# Patient Record
Sex: FEMALE | Race: WHITE | NOT HISPANIC OR LATINO | Employment: OTHER | URBAN - METROPOLITAN AREA
[De-identification: names, ages, dates, MRNs, and addresses within clinical notes are randomized per-mention and may not be internally consistent; named-entity substitution may affect disease eponyms.]

---

## 2017-01-26 ENCOUNTER — OFFICE VISIT (OUTPATIENT)
Dept: GYNECOLOGIC ONCOLOGY | Facility: CLINIC | Age: 72
End: 2017-01-26

## 2017-01-26 VITALS
DIASTOLIC BLOOD PRESSURE: 69 MMHG | BODY MASS INDEX: 24.84 KG/M2 | TEMPERATURE: 98.2 F | HEART RATE: 71 BPM | WEIGHT: 135 LBS | OXYGEN SATURATION: 98 % | RESPIRATION RATE: 14 BRPM | SYSTOLIC BLOOD PRESSURE: 159 MMHG | HEIGHT: 62 IN

## 2017-01-26 DIAGNOSIS — N85.8 UTERINE MASS: Primary | ICD-10-CM

## 2017-01-26 DIAGNOSIS — N95.0 PMB (POSTMENOPAUSAL BLEEDING): ICD-10-CM

## 2017-01-26 PROCEDURE — 99204 OFFICE O/P NEW MOD 45 MIN: CPT | Performed by: OBSTETRICS & GYNECOLOGY

## 2017-01-26 RX ORDER — CYANOCOBALAMIN (VITAMIN B-12) 5000 MCG
5000 TABLET,DISINTEGRATING ORAL DAILY
COMMUNITY

## 2017-01-26 RX ORDER — ATORVASTATIN CALCIUM 10 MG/1
10 TABLET, FILM COATED ORAL DAILY
COMMUNITY
Start: 2017-01-18 | End: 2020-01-17 | Stop reason: DRUGHIGH

## 2017-01-26 RX ORDER — HYDROXYCHLOROQUINE SULFATE 200 MG/1
200 TABLET, FILM COATED ORAL DAILY
Refills: 3 | COMMUNITY
Start: 2016-11-06 | End: 2022-09-15

## 2017-01-26 RX ORDER — CHOLECALCIFEROL (VITAMIN D3) 125 MCG
1 CAPSULE ORAL DAILY
COMMUNITY

## 2017-01-26 RX ORDER — NICOTINE POLACRILEX 2 MG
1 GUM BUCCAL DAILY
COMMUNITY
End: 2017-02-28

## 2017-01-26 RX ORDER — AMLODIPINE BESYLATE AND BENAZEPRIL HYDROCHLORIDE 10; 20 MG/1; MG/1
CAPSULE ORAL
Refills: 11 | COMMUNITY
Start: 2017-01-18

## 2017-01-26 RX ORDER — HYDROCHLOROTHIAZIDE 25 MG/1
TABLET ORAL
Refills: 3 | COMMUNITY
Start: 2016-11-06 | End: 2022-08-09 | Stop reason: HOSPADM

## 2017-01-26 RX ORDER — ASPIRIN 81 MG/1
81 TABLET ORAL DAILY
COMMUNITY
End: 2022-09-08

## 2017-01-26 RX ORDER — FLUTICASONE PROPIONATE 50 MCG
2 SPRAY, SUSPENSION (ML) NASAL DAILY PRN
COMMUNITY

## 2017-01-26 NOTE — LETTER
Mary Saucedo  [id number]  1945      Reason for visit:  Postmenopausal bleeding     History of present illness:  The patient is a 71 y.o. year old female who presents today for postmenopausal bleeding.      She notes cramps since 2016.  She has had dark spotting since 10/2016.  The bleeding is 3 days at a time every 28 days.  She was referred to Dr. Harrell and underwent TVUS 2017 and a mass was seen.  No endometrial stripe was noted, and no ovaries were seen.  Cervix was normal.  No biopsy was performed due to mass.  Patient is unsure if she had fibroids when she was younger nor not.    She notes some white discharge, but no distinct tissue.  She underwent pap and pelvic by Dr. Quiros 2015.  She notes some fecal urgency, and some RT flank pain last night.  She noted pelvic pressure last night as well.        OBGYN History:  She is a .  She does not use HRT. She does not have a history of abnormal pap smears.      Oncologic History:   No history exists.         Past Medical History   Diagnosis Date   • Arthritis    • Bilateral carotid artery occlusion    • Cataracts, bilateral    • Chronic sinusitis    • Heart murmur    • Hyperlipidemia    • Hypertensive disorder    • PMB (postmenopausal bleeding)    • Sjoegren syndrome    • Uterine fibroid    • Wrist fracture, left        Past Surgical History   Procedure Laterality Date   • Hemorrhoidectomy     • Breast lumpectomy Right    • Breast lumpectomy Left 2009   • Belpharoptosis repair Bilateral        MEDICATIONS: The current medication list was reviewed with the patient and updated in the EMR this date per the Medical Assistant. Medication dosages and frequencies were confirmed to be accurate.      Allergies:  is allergic to codeine.    Social History:   Social History     Social History   • Marital status:      Spouse name: N/A   • Number of children: 1   • Years of education: N/A     Occupational History   • Not on file.        Social History Main Topics   • Smoking status: Former Smoker   • Smokeless tobacco: Not on file   • Alcohol use Yes   • Drug use: No   • Sexual activity: No     Other Topics Concern   • Not on file     Social History Narrative   • No narrative on file       Family History:    Family History   Problem Relation Age of Onset   • Alzheimer's disease Mother 80   • Colon cancer Father 60   • Heart attack Father 40   • Emphysema Father    • Arthritis Sister    • Stomach cancer Paternal Grandfather    • Lactose intolerance Daughter      milk allergy       Health Maintenance:    Health Maintenance   Topic Date Due   • HEPATITIS C SCREENING  1945   • TDAP/TD VACCINES (1 - Tdap) 03/08/1964   • ZOSTER VACCINE  03/08/2005   • PNEUMOCOCCAL VACCINES (65+ LOW/MEDIUM RISK) (1 of 2 - PCV13) 03/08/2010   • INFLUENZA VACCINE  08/01/2016   • MAMMOGRAM  12/20/2017         Review of Systems   Constitutional: Positive for unexpected weight change (10 pounds over 4 months weight gain). Negative for appetite change, fatigue and fever.   HENT: Positive for sinus pressure and tinnitus. Negative for congestion, hearing loss and sore throat.    Eyes: Positive for itching. Negative for pain and visual disturbance (Corrective lenses).   Respiratory: Negative for cough, shortness of breath and wheezing.    Cardiovascular: Negative for chest pain and leg swelling.   Gastrointestinal: Positive for abdominal pain (Hiatal hernia). Negative for abdominal distention, constipation, diarrhea, nausea and vomiting.   Endocrine: Negative for cold intolerance and heat intolerance.   Genitourinary: Positive for urgency, vaginal bleeding and vaginal discharge. Negative for difficulty urinating, dyspareunia, dysuria and frequency.   Musculoskeletal: Negative for back pain, gait problem and joint swelling.   Skin: Negative for rash.   Allergic/Immunologic: Negative for immunocompromised state.   Neurological: Negative for seizures, syncope, weakness,  numbness and headaches.   Hematological: Positive for adenopathy. Does not bruise/bleed easily.   Psychiatric/Behavioral: Negative for confusion, dysphoric mood and sleep disturbance. The patient is not nervous/anxious.        Physical Exam    Vitals:    01/26/17 1406   BP: 159/69   Pulse: 71   Resp: 14   Temp: 98.2 °F (36.8 °C)   SpO2: 98%     Body mass index is 24.49 kg/(m^2).      GENERAL: Alert, well-appearing female appearing her stated age who is in no apparent distress.   HEENT: Sclera anicteric. Head normocephalic, atraumatic. Mucus membranes moist.   NECK: Trachea midline, supple, without masses.  No thyromegaly.   BREASTS: Deferred  CARDIOVASCULAR: Normal rate, regular rhythm, no murmurs, rubs, or gallops.  No peripheral edema.  RESPIRATORY: Clear to auscultation bilaterally, normal respiratory effort  GASTROINTESTINAL:  Abdomen is soft, non-tender, non-distended, no rebound or guarding, no masses, or hernias. No HSM.    SKIN:  Warm, dry, well-perfused.  All visible areas intact.  No rashes, lesions, ulcers.  PSYCHIATRIC: AO x3, with appropriate affect, normal thought processes.  NEUROLOGIC: No focal deficits.    MUSCULOSKELETAL: Normal gait and station.   EXTREMITIES:   No cyanosis, clubbing, symmetric.  LYMPHATICS:  No cervical or inguinal adenopathy noted.     PELVIC exam:    GYNECOLOGIC:  External genitalia are free from lesion. Vaginal introitus is small.  On speculum examination, the cervix was free from lesion. On bimanual examination no mass was appreciated.  Uterus was enlarged for age in size and shape. There is no cervical motion or uterine tenderness. No cervical mass was palpated. Parametria were smooth. Rectovaginal exam was deferred.     ECOG PS 0    PROCEDURES:  none    Diagnostic Data:    Radiologic tests were reviewed  I reviewed records which are summarized as noted in HPI.       No results found for:        Assessment/Plan   This is a 71 y.o. woman with PMB.  She was offered D&C  with hysteroscopy vs definitive management with hysterectomy.  I think it is in her best interest to consider a step wise progression in diagnosis and treatment.  She was consented for surgery.  Risks and benefits were discussed.  All questions were answered.      FOLLOW UP: Return for surgery.    This was a 45 minute long visit with greater that 50% spent in face-to-face consultation regarding the differential diagnosis of PMB and diagnostic evaluation thereof.    Note: Speech recognition transcription software was used to dictate portions of this document.  An attempt at proofreading has been made though minor errors in transcription may still be present.  Please do not hesitate to call our office with any questions.      Electronically Signed by: Addis Mccord MD  Date: 1/28/2017

## 2017-01-26 NOTE — MR AVS SNAPSHOT
Mary Sultanaeld   2017 3:15 PM   Office Visit    Dept Phone:  [phone]   Encounter #:  [id number]    Provider:  Addis Mccord MD   Department:  [facility] GYNECOLOGIC ONCOLOGY                Your Full Care Plan              Your Updated Medication List          This list is accurate as of: 17  3:48 PM.  Always use your most recent med list.                amLODIPine-benazepril 10-20 MG per capsule   Commonly known as:  LOTREL       aspirin 81 MG EC tablet       atorvastatin 10 MG tablet   Commonly known as:  LIPITOR       Biotin 1 MG capsule       fluticasone 50 MCG/ACT nasal spray   Commonly known as:  FLONASE       hydrochlorothiazide 25 MG tablet   Commonly known as:  HYDRODIURIL       hydroxychloroquine 200 MG tablet   Commonly known as:  PLAQUENIL       vitamin B-12 1000 MCG tablet   Commonly known as:  CYANOCOBALAMIN       Vitamin D3 2000 UNITS tablet               Instructions     None    Patient Instructions History      Upcoming Appointments     Visit Type Date Time Department    NEW PATIENT 2017  3:15 PM [facility]      [facility] [employer] Signup     [facility] [employer] allows you to send messages to your doctor, view your test results, renew your prescriptions, schedule appointments, and more. To sign up, go to [employer] and click on the Sign Up Now link in the New User? box. Enter your Eligible Activation Code exactly as it appears below along with the last four digits of your Social Security Number and your Date of Birth () to complete the sign-up process. If you do not sign up before the expiration date, you must request a new code.    Eligible Activation Code: [email or web address]  Expires: 2017  3:48 PM    If you have questions, you can email [email or web address] or call [phone] to talk to our Eligible staff. Remember, Eligible is NOT to be used for urgent needs. For medical emergencies, dial 911.             "  Other Info from Your Visit           Allergies     Codeine  Rash      Reason for Visit     Establish Care ovarian mass      Vital Signs     Blood Pressure Pulse Temperature Respirations Height Weight    159/69 71 98.2 °F (36.8 °C) (Temporal Artery ) 14 62.25\" (158.1 cm) 135 lb (61.2 kg)    Oxygen Saturation Body Mass Index Smoking Status             98% 24.49 kg/m2 Former Smoker           "

## 2017-01-26 NOTE — PROGRESS NOTES
Mary Saucedo  [id number]  1945      Reason for visit:  Postmenopausal bleeding     History of present illness:  The patient is a 71 y.o. year old female who presents today for postmenopausal bleeding.      She notes cramps since 2016.  She has had dark spotting since 10/2016.  The bleeding is 3 days at a time every 28 days.  She was referred to Dr. Harrell and underwent TVUS 2017 and a mass was seen.  No endometrial stripe was noted, and no ovaries were seen.  Cervix was normal.  No biopsy was performed due to mass.  Patient is unsure if she had fibroids when she was younger nor not.    She notes some white discharge, but no distinct tissue.  She underwent pap and pelvic by Dr. Quiros 2015.  She notes some fecal urgency, and some RT flank pain last night.  She noted pelvic pressure last night as well.        OBGYN History:  She is a .  She does not use HRT. She does not have a history of abnormal pap smears.      Oncologic History:   No history exists.         Past Medical History   Diagnosis Date   • Arthritis    • Bilateral carotid artery occlusion    • Cataracts, bilateral    • Chronic sinusitis    • Heart murmur    • Hyperlipidemia    • Hypertensive disorder    • PMB (postmenopausal bleeding)    • Sjoegren syndrome    • Uterine fibroid    • Wrist fracture, left        Past Surgical History   Procedure Laterality Date   • Hemorrhoidectomy     • Breast lumpectomy Right    • Breast lumpectomy Left 2009   • Belpharoptosis repair Bilateral        MEDICATIONS: The current medication list was reviewed with the patient and updated in the EMR this date per the Medical Assistant. Medication dosages and frequencies were confirmed to be accurate.      Allergies:  is allergic to codeine.    Social History:   Social History     Social History   • Marital status:      Spouse name: N/A   • Number of children: 1   • Years of education: N/A     Occupational History   • Not on file.      Social History Main Topics   • Smoking status: Former Smoker   • Smokeless tobacco: Not on file   • Alcohol use Yes   • Drug use: No   • Sexual activity: No     Other Topics Concern   • Not on file     Social History Narrative   • No narrative on file       Family History:    Family History   Problem Relation Age of Onset   • Alzheimer's disease Mother 80   • Colon cancer Father 60   • Heart attack Father 40   • Emphysema Father    • Arthritis Sister    • Stomach cancer Paternal Grandfather    • Lactose intolerance Daughter      milk allergy       Health Maintenance:    Health Maintenance   Topic Date Due   • HEPATITIS C SCREENING  1945   • TDAP/TD VACCINES (1 - Tdap) 03/08/1964   • ZOSTER VACCINE  03/08/2005   • PNEUMOCOCCAL VACCINES (65+ LOW/MEDIUM RISK) (1 of 2 - PCV13) 03/08/2010   • INFLUENZA VACCINE  08/01/2016   • MAMMOGRAM  12/20/2017         Review of Systems   Constitutional: Positive for unexpected weight change (10 pounds over 4 months weight gain). Negative for appetite change, fatigue and fever.   HENT: Positive for sinus pressure and tinnitus. Negative for congestion, hearing loss and sore throat.    Eyes: Positive for itching. Negative for pain and visual disturbance (Corrective lenses).   Respiratory: Negative for cough, shortness of breath and wheezing.    Cardiovascular: Negative for chest pain and leg swelling.   Gastrointestinal: Positive for abdominal pain (Hiatal hernia). Negative for abdominal distention, constipation, diarrhea, nausea and vomiting.   Endocrine: Negative for cold intolerance and heat intolerance.   Genitourinary: Positive for urgency, vaginal bleeding and vaginal discharge. Negative for difficulty urinating, dyspareunia, dysuria and frequency.   Musculoskeletal: Negative for back pain, gait problem and joint swelling.   Skin: Negative for rash.   Allergic/Immunologic: Negative for immunocompromised state.   Neurological: Negative for seizures, syncope, weakness,  numbness and headaches.   Hematological: Positive for adenopathy. Does not bruise/bleed easily.   Psychiatric/Behavioral: Negative for confusion, dysphoric mood and sleep disturbance. The patient is not nervous/anxious.        Physical Exam    Vitals:    01/26/17 1406   BP: 159/69   Pulse: 71   Resp: 14   Temp: 98.2 °F (36.8 °C)   SpO2: 98%     Body mass index is 24.49 kg/(m^2).      GENERAL: Alert, well-appearing female appearing her stated age who is in no apparent distress.   HEENT: Sclera anicteric. Head normocephalic, atraumatic. Mucus membranes moist.   NECK: Trachea midline, supple, without masses.  No thyromegaly.   BREASTS: Deferred  CARDIOVASCULAR: Normal rate, regular rhythm, no murmurs, rubs, or gallops.  No peripheral edema.  RESPIRATORY: Clear to auscultation bilaterally, normal respiratory effort  GASTROINTESTINAL:  Abdomen is soft, non-tender, non-distended, no rebound or guarding, no masses, or hernias. No HSM.    SKIN:  Warm, dry, well-perfused.  All visible areas intact.  No rashes, lesions, ulcers.  PSYCHIATRIC: AO x3, with appropriate affect, normal thought processes.  NEUROLOGIC: No focal deficits.    MUSCULOSKELETAL: Normal gait and station.   EXTREMITIES:   No cyanosis, clubbing, symmetric.  LYMPHATICS:  No cervical or inguinal adenopathy noted.     PELVIC exam:    GYNECOLOGIC:  External genitalia are free from lesion. Vaginal introitus is small.  On speculum examination, the cervix was free from lesion. On bimanual examination no mass was appreciated.  Uterus was enlarged for age in size and shape. There is no cervical motion or uterine tenderness. No cervical mass was palpated. Parametria were smooth. Rectovaginal exam was deferred.     ECOG PS 0    PROCEDURES:  none    Diagnostic Data:    Radiologic tests were reviewed  I reviewed records which are summarized as noted in HPI.       No results found for:        Assessment/Plan   This is a 71 y.o. woman with PMB.  She was offered D&C  with hysteroscopy vs definitive management with hysterectomy.  I think it is in her best interest to consider a step wise progression in diagnosis and treatment.  She was consented for surgery.  Risks and benefits were discussed.  All questions were answered.      FOLLOW UP: Return for surgery.    This was a 45 minute long visit with greater that 50% spent in face-to-face consultation regarding the differential diagnosis of PMB and diagnostic evaluation thereof.    Note: Speech recognition transcription software was used to dictate portions of this document.  An attempt at proofreading has been made though minor errors in transcription may still be present.  Please do not hesitate to call our office with any questions.      Electronically Signed by: Addis Mccord MD  Date: 1/28/2017

## 2017-01-28 PROBLEM — N95.0 PMB (POSTMENOPAUSAL BLEEDING): Status: ACTIVE | Noted: 2017-01-28

## 2017-01-28 PROBLEM — N85.8 UTERINE MASS: Status: ACTIVE | Noted: 2017-01-28

## 2017-01-30 ENCOUNTER — PREP FOR SURGERY (OUTPATIENT)
Dept: GYNECOLOGIC ONCOLOGY | Facility: CLINIC | Age: 72
End: 2017-01-30

## 2017-01-30 DIAGNOSIS — N95.0 PMB (POSTMENOPAUSAL BLEEDING): Primary | ICD-10-CM

## 2017-01-31 ENCOUNTER — PREP FOR SURGERY (OUTPATIENT)
Dept: GYNECOLOGIC ONCOLOGY | Facility: CLINIC | Age: 72
End: 2017-01-31

## 2017-01-31 NOTE — PATIENT INSTRUCTIONS
Outpatient Pre-op Patient Education  *See checked boxes for your instructions*    Mary Saucedo  [id number]  1945    SURGEON: Dr. Mccord    Appointment  [x]  1. Your surgery has been scheduled on 2-13-17 at [facility] located at [address]. You will need to be there at 9:00 AM.   [x] 2.  You have pre-admission testing (PAT) appointment on 2-12-17 at 2:00 PM.  You will need to be at hospital registration 10 minutes before that time.     [x] 3.  The registration department is located in the long hallway between the [address].       The Day(s) Before Surgery  [x] 1.  Do not drink alcohol or smoke.     [x] 2.  Do not take vitamins or aspirin one week before surgery.       [x] 3.  If you are ill on the days leading up to your surgery, please call our office.      [x] 4.  If you are using medications for diabetes, call the physician who manages these and get instructions on how they should be taken before and after surgery.    [x] 5.  Nothing to eat or drink after midnight on 2-12-17.      [x] 6.  Please make prior arrangements for someone to drive you home after your procedure        The Day of Surgery  [x] 1.  Do not eat, drink, or chew gum.     [x] 2.  On the morning of your surgery, you may take her prescription medications with a sip of water. Bring all medication with you to the surgery center. (Diabetic patients should bring insulin if instructed to do so by their diabetes managing physician).   [x] 3. Bathe or shower the morning of your surgery. Do not use powders, lotions, or creams. Deodorants are okay.     [x] 4. Wear loose, comfortable clothing.     [x] 5. Bring holders for glasses, contacts, or dentures.      [x] 6. Bring any required payment and forms, including insurance cards. Leave all money and valuables at home.        Post-surgery Instructions  [x] 1.  For the first 24 hours, rest and take periodic deep breaths to remove anesthetic agents from  your body.   [x] 2.  Follow any specific instructions relevant to your particular surgery.     [x] 3.  Limit activity to avoid stress to the surgical site.      [x] 4.  Keep all dressings dry. Shower or bathe as instructed by your doctor.     [x] 5.  Drink and eat light foods. Remain on liquids alone only if nausea and vomiting occur. Return to your regular diet gradually, as tolerance allows.    [x] 6. Avoid alcohol for at least 24 hours.      [x] 7.  Take prescription pain medication as directed and with food.      [x] 8.  Call our office after discharge from the surgery center to make your post-op appointment.        In Case of Emergency:  Call our office if you experience any of the following:  - Excessive drainage, bleeding, swelling, or redness at the incision site  - Severe pain not eased by pain medication  - Temperature above 101  - Persistent nausea or vomiting  - Skin rash or general body itching

## 2017-02-07 ENCOUNTER — TELEPHONE (OUTPATIENT)
Dept: GYNECOLOGIC ONCOLOGY | Facility: CLINIC | Age: 72
End: 2017-02-07

## 2017-02-07 NOTE — TELEPHONE ENCOUNTER
Pt returned call to office, informed her of [facility] being out of network with her insurance, number given to [facility] to talk about pricing.  Pt states she thinks she has a cold, she is congested, has wet cough, chills.  Instructed her to call her PCP to be seen and evaluated, call me back Thursday or Friday with update, or sooner if needed.  Pt verbalized understanding, will call if needed.

## 2017-02-09 ENCOUNTER — PREP FOR SURGERY (OUTPATIENT)
Dept: GYNECOLOGIC ONCOLOGY | Facility: CLINIC | Age: 72
End: 2017-02-09

## 2017-02-09 ENCOUNTER — TELEPHONE (OUTPATIENT)
Dept: GYNECOLOGIC ONCOLOGY | Facility: CLINIC | Age: 72
End: 2017-02-09

## 2017-02-09 DIAGNOSIS — N95.0 PMB (POSTMENOPAUSAL BLEEDING): Primary | ICD-10-CM

## 2017-02-09 RX ORDER — SODIUM CHLORIDE 0.9 % (FLUSH) 0.9 %
1-10 SYRINGE (ML) INJECTION AS NEEDED
Status: CANCELLED | OUTPATIENT
Start: 2017-02-09

## 2017-02-09 NOTE — TELEPHONE ENCOUNTER
Phoned pt regarding surgery scheduling.  States was started on Amoxicillin for URI and just called to see about getting in today to see PCP due to fever of 101 this am.  Told pt we would cancel her outpt procedure for Monday, instructed her she needed to be cleared after abx therapy by her PCP before having her surgery with Dr. Mccord, then we would schedule it in the [facility] OR but as an outpt, go home the same day.  Pt verbalized understanding, she will call me to let me know when she is scheduled for her f/u with PCP.

## 2017-02-17 ENCOUNTER — TELEPHONE (OUTPATIENT)
Dept: GYNECOLOGIC ONCOLOGY | Facility: CLINIC | Age: 72
End: 2017-02-17

## 2017-02-17 ENCOUNTER — PREP FOR SURGERY (OUTPATIENT)
Dept: GYNECOLOGIC ONCOLOGY | Facility: CLINIC | Age: 72
End: 2017-02-17

## 2017-02-17 DIAGNOSIS — N95.0 PMB (POSTMENOPAUSAL BLEEDING): Primary | ICD-10-CM

## 2017-02-17 NOTE — TELEPHONE ENCOUNTER
Pt phoned office.  States saw PCP this am, had CXR, she is fine for surgery.  Date set for 3-1-17 with Dr. Mccord.

## 2017-02-21 ENCOUNTER — PREP FOR SURGERY (OUTPATIENT)
Dept: GYNECOLOGIC ONCOLOGY | Facility: CLINIC | Age: 72
End: 2017-02-21

## 2017-02-21 RX ORDER — SODIUM CHLORIDE 0.9 % (FLUSH) 0.9 %
1-10 SYRINGE (ML) INJECTION AS NEEDED
Status: CANCELLED | OUTPATIENT
Start: 2017-02-21

## 2017-02-21 NOTE — PATIENT INSTRUCTIONS
Gynecologic Oncology  Inpatient Pre-op Patient Education  *See checked boxes for your instructions*    Patient Name:  Mary Saucedo  [id number]  1945    Surgeon:  Dr. Mccord    Appointment  [x]  1. Your surgery has been scheduled on 3-1-17. You will need to be at the second floor surgery registration of the [facility] hospital on that day at 8:00 AM.   [x] 2.  You have a pre-admission testing (PAT) appointment for labs and possibly chest xray and EKG, on 2-28-17 at 10:30 AM.  You will need to be at hospital registration on the first floor, 10 minutes before that time.     [x] 3.  The hospital registration department is located in the long hallway between the [address].     The Day(s) Before Surgery  [x] 1. On 2-28-17, the day prior to surgery, eat lightly.  No solid food after midnight on 2-28-17, including NO MILK, CREAM, OR ORANGE JUICE.  You may have sips of clear fluids up until two-three hours prior to your arrival to the hospital on the morning of surgery.     [] 2.  You may need to use a stool softener, the day prior to surgery to help with existing constipation and to clean out your bowels.  You can purchase Miralax over-the-counter at the pharmacy and follow the directions on the back.  (Do not do this step unless the box is checked).   [x] 3.  Do not take vitamins or full dose aspirin one week before surgery.  If you normally take a blood thinning medication such as Warfarin, Eliquis, or Xarelto, we will give you specific instructions regarding these medications and we may need to talk with your other doctors.   [x] 4.  On the morning of your surgery, you may likely take your routine prescription medications with a sip of water as reviewed with you by your surgeon.  Bring your home medications with you to the hospital as we may need to reference these.  In particular be sure to bring any inhalers.     Post-surgery Instructions  [] 1.  The length of stay for your type of surgery is  typically one hospital night, however it is also possible that you could be discharged home in the evening on the same day depending on the nature of your surgery.  All rooms are private, so family member may stay with you.     [x] 2.  Do not take your own home prescription medication while you are in the hospital unless otherwise instructed.  These will be provided to you.         Comments:

## 2017-02-28 ENCOUNTER — HOSPITAL ENCOUNTER (OUTPATIENT)
Dept: GENERAL RADIOLOGY | Facility: HOSPITAL | Age: 72
Discharge: HOME | End: 2017-02-28
Admitting: OBSTETRICS & GYNECOLOGY

## 2017-02-28 ENCOUNTER — APPOINTMENT (OUTPATIENT)
Dept: PREADMISSION TESTING | Facility: HOSPITAL | Age: 72
End: 2017-02-28

## 2017-02-28 ENCOUNTER — ANESTHESIA EVENT (OUTPATIENT)
Dept: PERIOP | Facility: HOSPITAL | Age: 72
End: 2017-02-28

## 2017-02-28 VITALS — WEIGHT: 135.58 LBS | HEIGHT: 62 IN | BODY MASS INDEX: 24.95 KG/M2

## 2017-02-28 DIAGNOSIS — N95.0 PMB (POSTMENOPAUSAL BLEEDING): ICD-10-CM

## 2017-02-28 LAB
ALBUMIN SERPL-MCNC: 3.9 G/DL (ref 3.2–4.8)
ALBUMIN/GLOB SERPL: 1.4 G/DL (ref 1.5–2.5)
ALP SERPL-CCNC: 64 U/L (ref 25–100)
ALT SERPL W P-5'-P-CCNC: 14 U/L (ref 7–40)
ANION GAP SERPL CALCULATED.3IONS-SCNC: 10 MMOL/L (ref 3–11)
AST SERPL-CCNC: 19 U/L (ref 0–33)
BASOPHILS # BLD AUTO: 0.04 10*3/MM3 (ref 0–0.2)
BASOPHILS NFR BLD AUTO: 1 % (ref 0–1)
BILIRUB SERPL-MCNC: 0.9 MG/DL (ref 0.3–1.2)
BUN BLD-MCNC: 14 MG/DL (ref 9–23)
BUN/CREAT SERPL: 23.3 (ref 7–25)
CALCIUM SPEC-SCNC: 9.5 MG/DL (ref 8.7–10.4)
CHLORIDE SERPL-SCNC: 108 MMOL/L (ref 99–109)
CO2 SERPL-SCNC: 24 MMOL/L (ref 20–31)
CREAT BLD-MCNC: 0.6 MG/DL (ref 0.6–1.3)
DEPRECATED RDW RBC AUTO: 47.7 FL (ref 37–54)
EOSINOPHIL # BLD AUTO: 0.08 10*3/MM3 (ref 0.1–0.3)
EOSINOPHIL NFR BLD AUTO: 2 % (ref 0–3)
ERYTHROCYTE [DISTWIDTH] IN BLOOD BY AUTOMATED COUNT: 13.5 % (ref 11.3–14.5)
GFR SERPL CREATININE-BSD FRML MDRD: 99 ML/MIN/1.73
GLOBULIN UR ELPH-MCNC: 2.8 GM/DL
GLUCOSE BLD-MCNC: 84 MG/DL (ref 70–100)
HCT VFR BLD AUTO: 37.1 % (ref 34.5–44)
HGB BLD-MCNC: 12.2 G/DL (ref 11.5–15.5)
IMM GRANULOCYTES # BLD: 0 10*3/MM3 (ref 0–0.03)
IMM GRANULOCYTES NFR BLD: 0 % (ref 0–0.6)
LYMPHOCYTES # BLD AUTO: 1.5 10*3/MM3 (ref 0.6–4.8)
LYMPHOCYTES NFR BLD AUTO: 37.9 % (ref 24–44)
MCH RBC QN AUTO: 31.9 PG (ref 27–31)
MCHC RBC AUTO-ENTMCNC: 32.9 G/DL (ref 32–36)
MCV RBC AUTO: 96.9 FL (ref 80–99)
MONOCYTES # BLD AUTO: 0.45 10*3/MM3 (ref 0–1)
MONOCYTES NFR BLD AUTO: 11.4 % (ref 0–12)
NEUTROPHILS # BLD AUTO: 1.89 10*3/MM3 (ref 1.5–8.3)
NEUTROPHILS NFR BLD AUTO: 47.7 % (ref 41–71)
PLATELET # BLD AUTO: 332 10*3/MM3 (ref 150–450)
PMV BLD AUTO: 9.5 FL (ref 6–12)
POTASSIUM BLD-SCNC: 3.9 MMOL/L (ref 3.5–5.5)
PROT SERPL-MCNC: 6.7 G/DL (ref 5.7–8.2)
RBC # BLD AUTO: 3.83 10*6/MM3 (ref 3.89–5.14)
SODIUM BLD-SCNC: 142 MMOL/L (ref 132–146)
WBC NRBC COR # BLD: 3.96 10*3/MM3 (ref 3.5–10.8)

## 2017-02-28 PROCEDURE — 85025 COMPLETE CBC W/AUTO DIFF WBC: CPT | Performed by: OBSTETRICS & GYNECOLOGY

## 2017-02-28 PROCEDURE — 93010 ELECTROCARDIOGRAM REPORT: CPT | Performed by: INTERNAL MEDICINE

## 2017-02-28 PROCEDURE — 71020 HC CHEST PA AND LATERAL: CPT

## 2017-02-28 PROCEDURE — 80053 COMPREHEN METABOLIC PANEL: CPT | Performed by: OBSTETRICS & GYNECOLOGY

## 2017-02-28 PROCEDURE — 93005 ELECTROCARDIOGRAM TRACING: CPT

## 2017-02-28 PROCEDURE — 36415 COLL VENOUS BLD VENIPUNCTURE: CPT

## 2017-02-28 RX ORDER — SODIUM CHLORIDE 0.9 % (FLUSH) 0.9 %
1-10 SYRINGE (ML) INJECTION AS NEEDED
Status: CANCELLED | OUTPATIENT
Start: 2017-02-28

## 2017-02-28 RX ORDER — FAMOTIDINE 10 MG/ML
20 INJECTION, SOLUTION INTRAVENOUS ONCE
Status: CANCELLED | OUTPATIENT
Start: 2017-02-28 | End: 2017-02-28

## 2017-03-01 ENCOUNTER — HOSPITAL ENCOUNTER (OUTPATIENT)
Facility: HOSPITAL | Age: 72
Setting detail: HOSPITAL OUTPATIENT SURGERY
Discharge: HOME | End: 2017-03-01
Attending: OBSTETRICS & GYNECOLOGY | Admitting: OBSTETRICS & GYNECOLOGY

## 2017-03-01 ENCOUNTER — ANESTHESIA (OUTPATIENT)
Dept: PERIOP | Facility: HOSPITAL | Age: 72
End: 2017-03-01

## 2017-03-01 VITALS
HEIGHT: 62 IN | DIASTOLIC BLOOD PRESSURE: 64 MMHG | HEART RATE: 75 BPM | TEMPERATURE: 98.1 F | OXYGEN SATURATION: 100 % | BODY MASS INDEX: 24.84 KG/M2 | SYSTOLIC BLOOD PRESSURE: 129 MMHG | RESPIRATION RATE: 18 BRPM | WEIGHT: 135 LBS

## 2017-03-01 DIAGNOSIS — N95.0 PMB (POSTMENOPAUSAL BLEEDING): ICD-10-CM

## 2017-03-01 PROCEDURE — 25010000002 DEXAMETHASONE PER 1 MG: Performed by: NURSE ANESTHETIST, CERTIFIED REGISTERED

## 2017-03-01 PROCEDURE — 25010000002 FENTANYL CITRATE (PF) 100 MCG/2ML SOLUTION: Performed by: NURSE ANESTHETIST, CERTIFIED REGISTERED

## 2017-03-01 PROCEDURE — 25010000002 PROPOFOL 10 MG/ML EMULSION: Performed by: NURSE ANESTHETIST, CERTIFIED REGISTERED

## 2017-03-01 PROCEDURE — 25010000002 ONDANSETRON PER 1 MG: Performed by: NURSE ANESTHETIST, CERTIFIED REGISTERED

## 2017-03-01 PROCEDURE — 58558 HYSTEROSCOPY BIOPSY: CPT | Performed by: OBSTETRICS & GYNECOLOGY

## 2017-03-01 PROCEDURE — 25010000003 CEFAZOLIN IN DEXTROSE 2-4 GM/100ML-% SOLUTION: Performed by: OBSTETRICS & GYNECOLOGY

## 2017-03-01 PROCEDURE — 88305 TISSUE EXAM BY PATHOLOGIST: CPT | Performed by: OBSTETRICS & GYNECOLOGY

## 2017-03-01 RX ORDER — FAMOTIDINE 20 MG/1
20 TABLET, FILM COATED ORAL ONCE
Status: COMPLETED | OUTPATIENT
Start: 2017-03-01 | End: 2017-03-01

## 2017-03-01 RX ORDER — FENTANYL CITRATE 50 UG/ML
50 INJECTION, SOLUTION INTRAMUSCULAR; INTRAVENOUS
Status: DISCONTINUED | OUTPATIENT
Start: 2017-03-01 | End: 2017-03-01 | Stop reason: HOSPADM

## 2017-03-01 RX ORDER — ONDANSETRON 2 MG/ML
INJECTION INTRAMUSCULAR; INTRAVENOUS AS NEEDED
Status: DISCONTINUED | OUTPATIENT
Start: 2017-03-01 | End: 2017-03-01 | Stop reason: SURG

## 2017-03-01 RX ORDER — ONDANSETRON 2 MG/ML
4 INJECTION INTRAMUSCULAR; INTRAVENOUS ONCE AS NEEDED
Status: DISCONTINUED | OUTPATIENT
Start: 2017-03-01 | End: 2017-03-01 | Stop reason: HOSPADM

## 2017-03-01 RX ORDER — PROPOFOL 10 MG/ML
VIAL (ML) INTRAVENOUS AS NEEDED
Status: DISCONTINUED | OUTPATIENT
Start: 2017-03-01 | End: 2017-03-01 | Stop reason: SURG

## 2017-03-01 RX ORDER — AMOXICILLIN 500 MG/1
1000 CAPSULE ORAL 3 TIMES DAILY
COMMUNITY
End: 2018-10-23

## 2017-03-01 RX ORDER — SODIUM CHLORIDE, SODIUM LACTATE, POTASSIUM CHLORIDE, CALCIUM CHLORIDE 600; 310; 30; 20 MG/100ML; MG/100ML; MG/100ML; MG/100ML
9 INJECTION, SOLUTION INTRAVENOUS CONTINUOUS
Status: DISCONTINUED | OUTPATIENT
Start: 2017-03-01 | End: 2017-03-01 | Stop reason: HOSPADM

## 2017-03-01 RX ORDER — LIDOCAINE HYDROCHLORIDE 10 MG/ML
1 INJECTION, SOLUTION EPIDURAL; INFILTRATION; INTRACAUDAL; PERINEURAL ONCE
Status: COMPLETED | OUTPATIENT
Start: 2017-03-01 | End: 2017-03-01

## 2017-03-01 RX ORDER — DEXAMETHASONE SODIUM PHOSPHATE 4 MG/ML
INJECTION, SOLUTION INTRA-ARTICULAR; INTRALESIONAL; INTRAMUSCULAR; INTRAVENOUS; SOFT TISSUE AS NEEDED
Status: DISCONTINUED | OUTPATIENT
Start: 2017-03-01 | End: 2017-03-01 | Stop reason: SURG

## 2017-03-01 RX ORDER — IBUPROFEN 600 MG/1
600 TABLET ORAL EVERY 6 HOURS PRN
Qty: 60 TABLET | Refills: 0 | Status: SHIPPED | OUTPATIENT
Start: 2017-03-01 | End: 2022-08-09 | Stop reason: HOSPADM

## 2017-03-01 RX ORDER — LIDOCAINE HYDROCHLORIDE 10 MG/ML
INJECTION, SOLUTION INFILTRATION; PERINEURAL AS NEEDED
Status: DISCONTINUED | OUTPATIENT
Start: 2017-03-01 | End: 2017-03-01 | Stop reason: SURG

## 2017-03-01 RX ORDER — CEFAZOLIN SODIUM 2 G/100ML
2 INJECTION, SOLUTION INTRAVENOUS ONCE
Status: COMPLETED | OUTPATIENT
Start: 2017-03-01 | End: 2017-03-01

## 2017-03-01 RX ORDER — FENTANYL CITRATE 50 UG/ML
INJECTION, SOLUTION INTRAMUSCULAR; INTRAVENOUS AS NEEDED
Status: DISCONTINUED | OUTPATIENT
Start: 2017-03-01 | End: 2017-03-01 | Stop reason: SURG

## 2017-03-01 RX ORDER — ACETAMINOPHEN 325 MG/1
650 TABLET ORAL EVERY 4 HOURS PRN
Qty: 1 BOTTLE | Refills: 0 | Status: SHIPPED | OUTPATIENT
Start: 2017-03-01

## 2017-03-01 RX ORDER — MAGNESIUM HYDROXIDE 1200 MG/15ML
LIQUID ORAL AS NEEDED
Status: DISCONTINUED | OUTPATIENT
Start: 2017-03-01 | End: 2017-03-01 | Stop reason: HOSPADM

## 2017-03-01 RX ADMIN — SODIUM CHLORIDE, POTASSIUM CHLORIDE, SODIUM LACTATE AND CALCIUM CHLORIDE: 600; 310; 30; 20 INJECTION, SOLUTION INTRAVENOUS at 10:06

## 2017-03-01 RX ADMIN — LIDOCAINE HYDROCHLORIDE 50 MG: 10 INJECTION, SOLUTION INFILTRATION; PERINEURAL at 10:15

## 2017-03-01 RX ADMIN — CEFAZOLIN SODIUM 2 G: 2 INJECTION, SOLUTION INTRAVENOUS at 10:06

## 2017-03-01 RX ADMIN — DEXAMETHASONE SODIUM PHOSPHATE 8 MG: 4 INJECTION, SOLUTION INTRAMUSCULAR; INTRAVENOUS at 10:18

## 2017-03-01 RX ADMIN — SODIUM CHLORIDE, POTASSIUM CHLORIDE, SODIUM LACTATE AND CALCIUM CHLORIDE 9 ML/HR: 600; 310; 30; 20 INJECTION, SOLUTION INTRAVENOUS at 09:18

## 2017-03-01 RX ADMIN — FAMOTIDINE 20 MG: 20 TABLET ORAL at 09:23

## 2017-03-01 RX ADMIN — EPHEDRINE SULFATE 10 MG: 50 INJECTION INTRAMUSCULAR; INTRAVENOUS; SUBCUTANEOUS at 10:35

## 2017-03-01 RX ADMIN — FENTANYL CITRATE 25 MCG: 50 INJECTION, SOLUTION INTRAMUSCULAR; INTRAVENOUS at 10:38

## 2017-03-01 RX ADMIN — FENTANYL CITRATE 25 MCG: 50 INJECTION, SOLUTION INTRAMUSCULAR; INTRAVENOUS at 10:26

## 2017-03-01 RX ADMIN — LIDOCAINE HYDROCHLORIDE 0.4 ML: 10 INJECTION, SOLUTION EPIDURAL; INFILTRATION; INTRACAUDAL; PERINEURAL at 09:17

## 2017-03-01 RX ADMIN — FENTANYL CITRATE 50 MCG: 50 INJECTION, SOLUTION INTRAMUSCULAR; INTRAVENOUS at 10:09

## 2017-03-01 RX ADMIN — EPHEDRINE SULFATE 10 MG: 50 INJECTION INTRAMUSCULAR; INTRAVENOUS; SUBCUTANEOUS at 10:17

## 2017-03-01 RX ADMIN — PROPOFOL 150 MG: 10 INJECTION, EMULSION INTRAVENOUS at 10:15

## 2017-03-01 RX ADMIN — ONDANSETRON 4 MG: 2 INJECTION INTRAMUSCULAR; INTRAVENOUS at 10:20

## 2017-03-01 NOTE — ANESTHESIA PREPROCEDURE EVALUATION
Anesthesia Evaluation     Patient summary reviewed and Nursing notes reviewed   no history of anesthetic complications:  NPO Status: > 8 hours   Airway   Mallampati: II  TM distance: >3 FB  Neck ROM: full  no difficulty expected  Dental - normal exam     Pulmonary    (+) a smoker Former, COPD moderate, decreased breath sounds,   Cardiovascular - normal exam  Exercise tolerance: good (4-7 METS)    ECG reviewed  Rhythm: regular  Rate: normal    (+) hypertension well controlled,   (-) valvular problems/murmurs      Neuro/Psych  (+) headaches,    GI/Hepatic/Renal/Endo    (+)  hiatal hernia, GERD well controlled,     Musculoskeletal     (+) back pain,   Abdominal   (-) obese    Abdomen: soft.   Substance History      OB/GYN          Other   (+) arthritis                               Anesthesia Plan    ASA 3     general     intravenous induction   Anesthetic plan and risks discussed with patient.    Plan discussed with CRNA.

## 2017-03-01 NOTE — ANESTHESIA POSTPROCEDURE EVALUATION
Patient: Mary Saucedo    Procedure Summary     Date Anesthesia Start Anesthesia Stop Room / Location    03/01/17 1006 1044 [facility] / [facility]       Procedure Diagnosis Surgeon Provider    DILATATION AND CURETTAGE HYSTEROSCOPY (N/A Vagina) PMB (postmenopausal bleeding)  (PMB (postmenopausal bleeding) [N95.0]) MD Alfonso Puentes MD          Anesthesia Type: general  Last vitals  /55 (03/01/17 1039)    Temp 97.7 °F (36.5 °C) (03/01/17 1039)    Pulse 69 (03/01/17 1039)   Resp 16 (03/01/17 1039)    SpO2 98 % (03/01/17 1039)      Post Anesthesia Care and Evaluation    Patient location during evaluation: PACU  Patient participation: complete - patient participated  Level of consciousness: awake and alert  Pain score: 0  Pain management: adequate  Airway patency: patent  Anesthetic complications: No anesthetic complications  PONV Status: none  Cardiovascular status: hemodynamically stable and acceptable  Respiratory status: nonlabored ventilation, acceptable and nasal cannula  Hydration status: acceptable

## 2017-03-01 NOTE — H&P
Pre-Op H&P    Chief complaint: PMB    HPI:    History of present illness: The patient is a 71 y.o. year old female who presents today for postmenopausal bleeding.      She notes cramps since 9/2016. She has had dark spotting since 10/2016. The bleeding is 3 days at a time every 28 days. She was referred to Dr. Harrell and underwent TVUS 1/12/2017 and a mass was seen. No endometrial stripe was noted, and no ovaries were seen. Cervix was normal. No biopsy was performed due to mass. Patient is unsure if she had fibroids when she was younger nor not.     She notes some white discharge, but no distinct tissue. She underwent pap and pelvic by Dr. Quiros 12/2015. She notes some fecal urgency, and some RT flank pain last night. She noted pelvic pressure last night as well.  She is here today for D&C, Hysteroscopy    Review of Systems:  General ROS: negative for chills, fever or skin lesions;  No changes since last office visit except recent bronchitis with abx treatment  Cardiovascular ROS: no chest pain or dyspnea on exertion  Respiratory ROS: no cough, shortness of breath, or wheezing     Allergies:   Allergies   Allergen Reactions   • Codeine Rash       Home Meds    Prescriptions Prior to Admission   Medication Sig Dispense Refill Last Dose   • amLODIPine-benazepril (LOTREL) 10-20 MG per capsule TK 1 C PO D  11 3/1/2017 at 0600   • amoxicillin (AMOXIL) 500 MG capsule Take 1,000 mg by mouth 3 (Three) Times a Day.   2/17/2017   • atorvastatin (LIPITOR) 10 MG tablet Take 10 mg by mouth Daily.   3/1/2017 at 0600   • hydroxychloroquine (PLAQUENIL) 200 MG tablet TK 1 T PO  QD UTD  3 3/1/2017 at 0600   • aspirin 81 MG EC tablet Take 81 mg by mouth Daily. Stopped 2/21/17 per dr barr   2/22/2017   • Cholecalciferol (VITAMIN D3) 2000 UNITS tablet Take 1 capsule by mouth Daily.   2/22/2017   • fluticasone (FLONASE) 50 MCG/ACT nasal spray 2 sprays into each nostril Daily. havent taken in about 5 days   2/22/2017   •  "hydrochlorothiazide (HYDRODIURIL) 25 MG tablet TK 1 T PO D FOR BLOOD PRESSURE.  3 2/22/2017   • vitamin B-12 (CYANOCOBALAMIN) 1000 MCG tablet Place 3,000 mcg under the tongue Daily.   2/22/2017       PMH:   Past Medical History   Diagnosis Date   • Arthritis    • Back pain    • Bronchitis      pcp diagnosed recently    • Cancer      precancerous removed from nose    • Carotid stenosis, bilateral      Nonobstructive   • Cataracts, bilateral    • Chronic sinusitis    • Emphysema lung    • GERD (gastroesophageal reflux disease)      no longer issue    • Heart murmur      at 18-   no longer real issue    • Hiatal hernia    • Hx: UTI (urinary tract infection)      back in oct 2016- no s/s currently    • Hyperlipidemia    • Hypertensive disorder    • Migraine      h/o    • PMB (postmenopausal bleeding)    • Sjoegren syndrome    • Uterine fibroid    • Wears glasses    • Wrist fracture, left      PSH:    Past Surgical History   Procedure Laterality Date   • Hemorrhoidectomy  1976   • Belpharoptosis repair Bilateral 1998   • Skin cancer excision       precancerous removed from nose    • Cardiovascular stress test       x3-4    • Breast lumpectomy Right 1989   • Breast lumpectomy Left 07/2009   • Skin biopsy     • Colonoscopy       x5   • Sharon tooth extraction     • Endoscopy       x2       Immunization History:  Influenza: yes 2016  Pneumococcal: yes < 5 years  Tetanus: unknown    Social History:   Tobacco:   History   Smoking Status   • Former Smoker   • Packs/day: 0.25   • Years: 10.00   • Types: Cigarettes   • Quit date: 2015   Smokeless Tobacco   • Never Used      Alcohol:   History   Alcohol Use   • Yes     Comment: occasional        Physical Exam:  Visit Vitals   • /64 (BP Location: Right arm, Patient Position: Lying)   • Pulse 65   • Temp 97.8 °F (36.6 °C) (Temporal Artery )   • Resp 16   • Ht 62\" (157.5 cm)   • Wt 135 lb (61.2 kg)   • SpO2 97%   • BMI 24.69 kg/m2       General Appearance:    Alert, " cooperative, no distress, appears stated age   Head:    Normocephalic, without obvious abnormality, atraumatic   Lungs:     Clear to auscultation bilaterally, respirations unlabored    Heart:   Regular rate and rhythm, S1 and S2 normal, no murmur, rub    or gallop    Abdomen:    Soft, non-tender.  +bowel sounds   Breast Exam:    deferred   Genitalia:    deferred   Extremities:   Extremities normal, atraumatic, no cyanosis or edema   Skin:   Skin color, texture, turgor normal, no rashes or lesions   Neurologic:   Grossly intact   Results Review  I reviewed the patient's new clinical results.    Cancer Staging (if applicable)  Cancer Patient: __ yes __no _X_unknown; If yes, clinical stage T:__ N:__M:__, stage group    Assessment/Plan:  This is a 71 y.o. woman with PMB. She was offered D&C with hysteroscopy vs definitive management with hysterectomy. I think it is in her best interest to consider a step wise progression in diagnosis and treatment. She was consented for surgery. Risks and benefits were discussed. All questions were answered.     Vivi Fox, OVIDIO 3/1/2017 9:19 AM     I saw and evaluated the patient. I agree with the findings and the plan of care as documented in the note.    Addis Mccord MD  03/01/17  9:57 AM

## 2017-03-01 NOTE — OP NOTE
DILATATION AND CURETTAGE HYSTEROSCOPY  Procedure Note    Mary Saucedo  3/1/2017    Pre-op Diagnosis:   PMB (postmenopausal bleeding) [N95.0]    Post-op Diagnosis:     Post-Op Diagnosis Codes:     * PMB (postmenopausal bleeding) [N95.0]    Procedure(s):  DILATATION AND CURETTAGE HYSTEROSCOPY    Surgeon(s):  Addis Mccord MD     Assistant:  Ilsa De La Torre MD  Resident    Anesthesia: General    Staff:   Circulator: Jolene Patel RN  Scrub Person: Karina Tran    Estimated Blood Loss: * No values recorded between 3/1/2017 10:06 AM and 3/1/2017 10:39 AM *    Specimens:                  Order Name Source Comment Collection Info Order Time   TISSUE EXAM Endometrial Curettings  Collected By: Addis Mccord MD 3/1/2017 10:33 AM         Drains:           Findings:   1. Postmenopausal endometrium  2. Anterior uterine fibroid     Complications: None immediate    Indications: Patient is a 70 yo woman with PMB.  She was noted to have a intrauterine mass on imaging and stenotic cervix on exam which precluded office biopsy.  Risks/benefits were reviewed.  Consent was signed and on chart.    Procedures:  After consent was obtained, the patient was taken to the operating room and underwent general endotracheal anesthesia. The patient was prepped and draped in a normal sterile fashion in the dorsal lithotomy position in [facility]. Vaginal retractors were used to visualize the cervix which was grasped on the anterior aspect with a single tooth tenaculum. The uterus sounded to approximately 8cm. The cervix was serially dilated in order to accommodate a hysteroscope. This was inserted through the cervix with good visualize of the uterine cavity. No polyps or hypertrophic endometrium. The cavity was slightly displaced posteriorly by what appeared to be an anterior fibroid. The hysteroscope was removed and scant endometrial curettings were obtained using sharp curette. The hysteroscope was inserted once  again through the cervix to visualize the endometrium which had minimal change. The hysteroscope and tenaculum were removed. Good hemostasis was noted at the tenaculum sites. All vaginal instruments were removed.    The patient tolerated from the procedure well. All lap and instrument counts were correct x3. She awoke from anesthesia uneventfully and was taken to the recovery room in stable condition. Dr. Mccord was present for the duration of the case.      Ilsa De La Torre MD     Date: 3/1/2017  Time: 10:53 AM

## 2017-03-02 ENCOUNTER — TELEPHONE (OUTPATIENT)
Dept: GYNECOLOGIC ONCOLOGY | Facility: CLINIC | Age: 72
End: 2017-03-02

## 2017-03-02 LAB
CYTO UR: NORMAL
LAB AP CASE REPORT: NORMAL
LAB AP CLINICAL INFORMATION: NORMAL
Lab: NORMAL
PATH REPORT.FINAL DX SPEC: NORMAL
PATH REPORT.GROSS SPEC: NORMAL

## 2017-03-02 NOTE — TELEPHONE ENCOUNTER
----- Message from Brooke Gramajo sent at 3/2/2017  1:52 PM EST -----  Contact: [phone]  Jose Francisco phoned. She was told by Dr. mccord that she didn't need to return post op but she has a few questions. Please call.  Returned pt's call.  States her friend does not remember what Dr. Mccord told her she cleaned her out and wants to know what she did.  Informed pt as per what the  operative note said.  Told her the results were not back yet, but will be notified of the results when they are.  Pt verbalized understanding, will wait to be notified or call before if needed.

## 2017-03-07 ENCOUNTER — TELEPHONE (OUTPATIENT)
Dept: GYNECOLOGIC ONCOLOGY | Facility: CLINIC | Age: 72
End: 2017-03-07

## 2017-03-08 ENCOUNTER — TELEPHONE (OUTPATIENT)
Dept: GYNECOLOGIC ONCOLOGY | Facility: CLINIC | Age: 72
End: 2017-03-08

## 2017-03-08 NOTE — TELEPHONE ENCOUNTER
Pt returned call to office.  Informed her of pathology results from D&C with Dr. Mccord.  Pt states has just a little bit of spotting today.  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 She says she does not have a fever, no pain, bathroom habits ok.  Instructed her to call if increase in bleeding, if it does not dissipate, develops fever, pain, or any other problems or concerns.  Pt v/u, will call if needed.

## 2018-10-23 ENCOUNTER — OFFICE VISIT (OUTPATIENT)
Dept: CARDIOLOGY | Facility: HOSPITAL | Age: 73
End: 2018-10-23

## 2018-10-23 ENCOUNTER — HOSPITAL ENCOUNTER (EMERGENCY)
Facility: HOSPITAL | Age: 73
Discharge: HOME | End: 2018-10-23
Attending: EMERGENCY MEDICINE | Admitting: EMERGENCY MEDICINE

## 2018-10-23 ENCOUNTER — TELEPHONE (OUTPATIENT)
Dept: CARDIOLOGY | Facility: HOSPITAL | Age: 73
End: 2018-10-23

## 2018-10-23 ENCOUNTER — HOSPITAL ENCOUNTER (OUTPATIENT)
Dept: CARDIOLOGY | Facility: HOSPITAL | Age: 73
Discharge: HOME | End: 2018-10-23
Admitting: NURSE PRACTITIONER

## 2018-10-23 ENCOUNTER — APPOINTMENT (OUTPATIENT)
Dept: GENERAL RADIOLOGY | Facility: HOSPITAL | Age: 73
End: 2018-10-23

## 2018-10-23 VITALS
OXYGEN SATURATION: 99 % | DIASTOLIC BLOOD PRESSURE: 74 MMHG | SYSTOLIC BLOOD PRESSURE: 157 MMHG | TEMPERATURE: 97.6 F | WEIGHT: 131 LBS | RESPIRATION RATE: 16 BRPM | HEART RATE: 65 BPM | BODY MASS INDEX: 24.11 KG/M2 | HEIGHT: 62 IN

## 2018-10-23 VITALS
SYSTOLIC BLOOD PRESSURE: 130 MMHG | WEIGHT: 131.8 LBS | TEMPERATURE: 98.1 F | OXYGEN SATURATION: 97 % | BODY MASS INDEX: 24.25 KG/M2 | DIASTOLIC BLOOD PRESSURE: 60 MMHG | HEIGHT: 62 IN | RESPIRATION RATE: 16 BRPM | HEART RATE: 62 BPM

## 2018-10-23 DIAGNOSIS — R06.09 DOE (DYSPNEA ON EXERTION): ICD-10-CM

## 2018-10-23 DIAGNOSIS — R00.0 TACHYCARDIA: ICD-10-CM

## 2018-10-23 DIAGNOSIS — E78.2 MIXED HYPERLIPIDEMIA: ICD-10-CM

## 2018-10-23 DIAGNOSIS — R00.2 PALPITATIONS: ICD-10-CM

## 2018-10-23 DIAGNOSIS — R07.2 PRECORDIAL PAIN: ICD-10-CM

## 2018-10-23 DIAGNOSIS — R53.83 OTHER FATIGUE: ICD-10-CM

## 2018-10-23 DIAGNOSIS — R06.00 DYSPNEA, UNSPECIFIED TYPE: Primary | ICD-10-CM

## 2018-10-23 DIAGNOSIS — R42 DIZZINESS: ICD-10-CM

## 2018-10-23 DIAGNOSIS — I10 ESSENTIAL HYPERTENSION: ICD-10-CM

## 2018-10-23 DIAGNOSIS — I77.9 BILATERAL CAROTID ARTERY DISEASE, UNSPECIFIED TYPE (HCC): ICD-10-CM

## 2018-10-23 DIAGNOSIS — R07.2 PRECORDIAL PAIN: Primary | ICD-10-CM

## 2018-10-23 LAB
ALBUMIN SERPL-MCNC: 4.18 G/DL (ref 3.2–4.8)
ALBUMIN/GLOB SERPL: 1.8 G/DL (ref 1.5–2.5)
ALP SERPL-CCNC: 61 U/L (ref 25–100)
ALT SERPL W P-5'-P-CCNC: 25 U/L (ref 7–40)
ANION GAP SERPL CALCULATED.3IONS-SCNC: 8 MMOL/L (ref 3–11)
AST SERPL-CCNC: 27 U/L (ref 0–33)
BACTERIA UR QL AUTO: ABNORMAL /HPF
BASOPHILS # BLD AUTO: 0.05 10*3/MM3 (ref 0–0.2)
BASOPHILS NFR BLD AUTO: 1 % (ref 0–1)
BH CV STRESS BP STAGE 1: NORMAL
BH CV STRESS BP STAGE 2: NORMAL
BH CV STRESS BP STAGE 3: NORMAL
BH CV STRESS DURATION MIN STAGE 1: 3
BH CV STRESS DURATION MIN STAGE 2: 3
BH CV STRESS DURATION MIN STAGE 3: 1
BH CV STRESS DURATION SEC STAGE 1: 0
BH CV STRESS DURATION SEC STAGE 2: 0
BH CV STRESS DURATION SEC STAGE 3: 29
BH CV STRESS GRADE STAGE 1: 10
BH CV STRESS GRADE STAGE 2: 12
BH CV STRESS GRADE STAGE 3: 14
BH CV STRESS HR STAGE 1: 97
BH CV STRESS HR STAGE 2: 116
BH CV STRESS HR STAGE 3: 130
BH CV STRESS METS STAGE 1: 5
BH CV STRESS METS STAGE 2: 7.5
BH CV STRESS METS STAGE 3: 10
BH CV STRESS PROTOCOL 1: NORMAL
BH CV STRESS RECOVERY BP: NORMAL MMHG
BH CV STRESS RECOVERY HR: 80 BPM
BH CV STRESS SPEED STAGE 1: 1.7
BH CV STRESS SPEED STAGE 2: 2.5
BH CV STRESS SPEED STAGE 3: 3.4
BH CV STRESS STAGE 1: 1
BH CV STRESS STAGE 2: 2
BH CV STRESS STAGE 3: 3
BILIRUB SERPL-MCNC: 0.6 MG/DL (ref 0.3–1.2)
BILIRUB UR QL STRIP: NEGATIVE
BNP SERPL-MCNC: 73 PG/ML (ref 0–100)
BUN BLD-MCNC: 23 MG/DL (ref 9–23)
BUN/CREAT SERPL: 22.1 (ref 7–25)
CALCIUM SPEC-SCNC: 9.6 MG/DL (ref 8.7–10.4)
CHLORIDE SERPL-SCNC: 108 MMOL/L (ref 99–109)
CLARITY UR: ABNORMAL
CO2 SERPL-SCNC: 22 MMOL/L (ref 20–31)
COLOR UR: YELLOW
CREAT BLD-MCNC: 1.04 MG/DL (ref 0.6–1.3)
D DIMER PPP FEU-MCNC: 0.25 MG/L (FEU) (ref 0–0.5)
DEPRECATED RDW RBC AUTO: 43.9 FL (ref 37–54)
EOSINOPHIL # BLD AUTO: 0.09 10*3/MM3 (ref 0–0.3)
EOSINOPHIL NFR BLD AUTO: 1.7 % (ref 0–3)
ERYTHROCYTE [DISTWIDTH] IN BLOOD BY AUTOMATED COUNT: 12.4 % (ref 11.3–14.5)
GFR SERPL CREATININE-BSD FRML MDRD: 52 ML/MIN/1.73
GLOBULIN UR ELPH-MCNC: 2.3 GM/DL
GLUCOSE BLD-MCNC: 98 MG/DL (ref 70–100)
GLUCOSE UR STRIP-MCNC: NEGATIVE MG/DL
HCT VFR BLD AUTO: 40.3 % (ref 34.5–44)
HGB BLD-MCNC: 13.2 G/DL (ref 11.5–15.5)
HGB UR QL STRIP.AUTO: NEGATIVE
HOLD SPECIMEN: NORMAL
HOLD SPECIMEN: NORMAL
HYALINE CASTS UR QL AUTO: ABNORMAL /LPF
IMM GRANULOCYTES # BLD: 0.01 10*3/MM3 (ref 0–0.03)
IMM GRANULOCYTES NFR BLD: 0.2 % (ref 0–0.6)
KETONES UR QL STRIP: NEGATIVE
LEUKOCYTE ESTERASE UR QL STRIP.AUTO: ABNORMAL
LYMPHOCYTES # BLD AUTO: 1.5 10*3/MM3 (ref 0.6–4.8)
LYMPHOCYTES NFR BLD AUTO: 29 % (ref 24–44)
MAXIMAL PREDICTED HEART RATE: 147 BPM
MCH RBC QN AUTO: 31.8 PG (ref 27–31)
MCHC RBC AUTO-ENTMCNC: 32.8 G/DL (ref 32–36)
MCV RBC AUTO: 97.1 FL (ref 80–99)
MONOCYTES # BLD AUTO: 0.45 10*3/MM3 (ref 0–1)
MONOCYTES NFR BLD AUTO: 8.7 % (ref 0–12)
NEUTROPHILS # BLD AUTO: 3.07 10*3/MM3 (ref 1.5–8.3)
NEUTROPHILS NFR BLD AUTO: 59.4 % (ref 41–71)
NITRITE UR QL STRIP: NEGATIVE
PERCENT MAX PREDICTED HR: 89.12 %
PH UR STRIP.AUTO: <=5 [PH] (ref 5–8)
PLATELET # BLD AUTO: 276 10*3/MM3 (ref 150–450)
PMV BLD AUTO: 9.7 FL (ref 6–12)
POTASSIUM BLD-SCNC: 4.1 MMOL/L (ref 3.5–5.5)
PROT SERPL-MCNC: 6.5 G/DL (ref 5.7–8.2)
PROT UR QL STRIP: NEGATIVE
RBC # BLD AUTO: 4.15 10*6/MM3 (ref 3.89–5.14)
RBC # UR: ABNORMAL /HPF
REF LAB TEST METHOD: ABNORMAL
SODIUM BLD-SCNC: 138 MMOL/L (ref 132–146)
SP GR UR STRIP: 1.01 (ref 1–1.03)
SQUAMOUS #/AREA URNS HPF: ABNORMAL /HPF
STRESS BASELINE BP: NORMAL MMHG
STRESS BASELINE HR: 65 BPM
STRESS PERCENT HR: 105 %
STRESS POST ESTIMATED WORKLOAD: 9.2 METS
STRESS POST EXERCISE DUR MIN: 7 MIN
STRESS POST EXERCISE DUR SEC: 29 SEC
STRESS POST PEAK BP: NORMAL MMHG
STRESS POST PEAK HR: 131 BPM
STRESS TARGET HR: 125 BPM
TROPONIN I SERPL-MCNC: 0 NG/ML (ref 0–0.07)
UROBILINOGEN UR QL STRIP: ABNORMAL
WBC NRBC COR # BLD: 5.17 10*3/MM3 (ref 3.5–10.8)
WBC UR QL AUTO: ABNORMAL /HPF
WHOLE BLOOD HOLD SPECIMEN: NORMAL
WHOLE BLOOD HOLD SPECIMEN: NORMAL

## 2018-10-23 PROCEDURE — 99284 EMERGENCY DEPT VISIT MOD MDM: CPT

## 2018-10-23 PROCEDURE — 93270 REMOTE 30 DAY ECG REV/REPORT: CPT

## 2018-10-23 PROCEDURE — 93272 ECG/REVIEW INTERPRET ONLY: CPT | Performed by: INTERNAL MEDICINE

## 2018-10-23 PROCEDURE — 85379 FIBRIN DEGRADATION QUANT: CPT | Performed by: EMERGENCY MEDICINE

## 2018-10-23 PROCEDURE — 71045 X-RAY EXAM CHEST 1 VIEW: CPT

## 2018-10-23 PROCEDURE — 93017 CV STRESS TEST TRACING ONLY: CPT

## 2018-10-23 PROCEDURE — 93018 CV STRESS TEST I&R ONLY: CPT | Performed by: INTERNAL MEDICINE

## 2018-10-23 PROCEDURE — 84484 ASSAY OF TROPONIN QUANT: CPT

## 2018-10-23 PROCEDURE — 99204 OFFICE O/P NEW MOD 45 MIN: CPT | Performed by: NURSE PRACTITIONER

## 2018-10-23 PROCEDURE — 93005 ELECTROCARDIOGRAM TRACING: CPT | Performed by: EMERGENCY MEDICINE

## 2018-10-23 PROCEDURE — 81001 URINALYSIS AUTO W/SCOPE: CPT | Performed by: EMERGENCY MEDICINE

## 2018-10-23 PROCEDURE — 83880 ASSAY OF NATRIURETIC PEPTIDE: CPT | Performed by: EMERGENCY MEDICINE

## 2018-10-23 PROCEDURE — 80053 COMPREHEN METABOLIC PANEL: CPT | Performed by: EMERGENCY MEDICINE

## 2018-10-23 PROCEDURE — 85025 COMPLETE CBC W/AUTO DIFF WBC: CPT | Performed by: EMERGENCY MEDICINE

## 2018-10-23 RX ORDER — FLUCONAZOLE 150 MG/1
150 TABLET ORAL ONCE
Status: COMPLETED | OUTPATIENT
Start: 2018-10-23 | End: 2018-10-23

## 2018-10-23 RX ORDER — OXYBUTYNIN CHLORIDE 10 MG/1
10 TABLET, EXTENDED RELEASE ORAL DAILY
COMMUNITY
Start: 2018-10-18 | End: 2022-08-17

## 2018-10-23 RX ORDER — SODIUM CHLORIDE 0.9 % (FLUSH) 0.9 %
10 SYRINGE (ML) INJECTION AS NEEDED
Status: DISCONTINUED | OUTPATIENT
Start: 2018-10-23 | End: 2018-10-23 | Stop reason: HOSPADM

## 2018-10-23 RX ORDER — NAPROXEN SODIUM 220 MG
220 TABLET ORAL 2 TIMES DAILY PRN
COMMUNITY
End: 2022-08-09 | Stop reason: HOSPADM

## 2018-10-23 RX ORDER — SULFAMETHOXAZOLE AND TRIMETHOPRIM 800; 160 MG/1; MG/1
1 TABLET ORAL 2 TIMES DAILY
COMMUNITY
End: 2018-10-23

## 2018-10-23 RX ADMIN — FLUCONAZOLE 150 MG: 150 TABLET ORAL at 08:38

## 2018-10-23 NOTE — PROGRESS NOTES
Encounter Date:10/23/2018      Patient ID: Mary Saucedo is a 73 y.o. female.        Subjective:     Chief Complaint: Establish Care   tachycardia, CP  History of Present Illness Patient presents to the office today at the request of Dr. Yang from the ED for ongoing evaluation of her tachycardia, lightheadedness, and chest pain. She notes that she has been having episodes of generalized weakness over the last few months but notes that they are worsening in intensity. She notes that she awoke this am to use the restroom and her fitbit noted that her heart rate went up to 125. Normal hr is usually in the 60s. She noted associated dyspnea. She also notes that she has been experiencing chest heaviness in left chest that worsens with exertion with radiation to her neck and between her shoulder blades. She has known carotid disease with last duplex 3 years ago at [facility]. She notes significant fatigue over the last few months as well. She notes intermittent dizziness and lightheadedness. She has a history of a heart murmur. She notes that she had a stress test 8 years ago in [facility], data deficient.     Patient Active Problem List   Diagnosis   • PMB (postmenopausal bleeding)   • Uterine mass   • Precordial pain   • Essential hypertension   • Mixed hyperlipidemia   • Other fatigue   • MOORE (dyspnea on exertion)   • Palpitations   • Tachycardia   • Dizziness     Past Medical History:   Diagnosis Date   • Arthritis    • Back pain    • Bronchitis     pcp diagnosed recently    • Cancer (CMS/HCC)     precancerous removed from nose    • Carotid stenosis, bilateral     Nonobstructive   • Cataracts, bilateral    • Chronic sinusitis    • Emphysema lung (CMS/HCC)    • GERD (gastroesophageal reflux disease)     no longer issue    • Heart murmur     at 18-   no longer real issue    • Hiatal hernia    • Hx: UTI (urinary tract infection)     back in oct 2016- no s/s currently    • Hyperlipidemia    • Hypertensive  disorder    • Migraine     h/o    • PMB (postmenopausal bleeding)    • Sjoegren syndrome (CMS/HCC)    • Uterine fibroid    • Wears glasses    • Wrist fracture, left          Past Surgical History:   Procedure Laterality Date   • BELPHAROPTOSIS REPAIR Bilateral 1998   • BREAST LUMPECTOMY Right 1989   • BREAST LUMPECTOMY Left 07/2009   • CARDIOVASCULAR STRESS TEST      x3-4    • COLONOSCOPY      x5   • D&C HYSTEROSCOPY N/A 3/1/2017    Procedure: DILATATION AND CURETTAGE HYSTEROSCOPY;  Surgeon: Addis Barr MD;  Location: [facility];  Service:    • ENDOSCOPY      x2   • HEMORRHOIDECTOMY  1976   • SKIN BIOPSY     • SKIN CANCER EXCISION      precancerous removed from nose    • WISDOM TOOTH EXTRACTION         Allergies   Allergen Reactions   • Hydrocodone Nausea And Vomiting   • Codeine Rash         Current Outpatient Prescriptions:   •  amLODIPine-benazepril (LOTREL) 10-20 MG per capsule, TK 1 C PO D, Disp: , Rfl: 11  •  aspirin 81 MG EC tablet, Take 81 mg by mouth Daily. Stopped 2/21/17 per dr barr, Disp: , Rfl:   •  atorvastatin (LIPITOR) 10 MG tablet, Take 10 mg by mouth Daily., Disp: , Rfl:   •  Cholecalciferol (VITAMIN D3) 2000 UNITS tablet, Take 1 capsule by mouth Daily., Disp: , Rfl:   •  fluticasone (FLONASE) 50 MCG/ACT nasal spray, 2 sprays into the nostril(s) as directed by provider Daily As Needed. havent taken in about 5 days, Disp: , Rfl:   •  hydrochlorothiazide (HYDRODIURIL) 25 MG tablet, TK 1 T PO D FOR BLOOD PRESSURE., Disp: , Rfl: 3  •  hydroxychloroquine (PLAQUENIL) 200 MG tablet, Take 200 mg by mouth Daily. wilder, Disp: , Rfl: 3  •  naproxen sodium (ALEVE) 220 MG tablet, Take 220 mg by mouth 2 (Two) Times a Day As Needed., Disp: , Rfl:   •  oxybutynin XL (DITROPAN-XL) 10 MG 24 hr tablet, 10 mg Daily., Disp: , Rfl:   •  vitamin B-12 (CYANOCOBALAMIN) 1000 MCG tablet, Place 3,000 mcg under the tongue Daily., Disp: , Rfl:   •  acetaminophen (TYLENOL) 325 MG tablet, Take 2 tablets by mouth Every  4 (Four) Hours As Needed for mild pain (1-3)., Disp: 1 bottle, Rfl: 0  •  ibuprofen (ADVIL,MOTRIN) 600 MG tablet, Take 1 tablet by mouth Every 6 (Six) Hours As Needed for mild pain (1-3)., Disp: 60 tablet, Rfl: 0    The following portions of the chart were reviewed and updated as appropriate: Allergies, current medications, past family history, social history, past medical history.     Review of Systems   Constitution: Positive for weakness and malaise/fatigue. Negative for chills, decreased appetite, diaphoresis, fever, night sweats, weight gain and weight loss.   HENT: Positive for congestion. Negative for hearing loss, hoarse voice and nosebleeds.    Eyes: Negative for blurred vision, visual disturbance and visual halos.   Cardiovascular: Positive for chest pain, dyspnea on exertion and near-syncope. Negative for claudication, cyanosis, irregular heartbeat, leg swelling, orthopnea, palpitations, paroxysmal nocturnal dyspnea and syncope.   Respiratory: Positive for sputum production. Negative for cough, hemoptysis, shortness of breath, sleep disturbances due to breathing, snoring and wheezing.    Endocrine: Positive for cold intolerance.   Hematologic/Lymphatic: Negative for bleeding problem. Does not bruise/bleed easily.   Skin: Negative for dry skin, itching and rash.   Musculoskeletal: Positive for joint pain. Negative for arthritis, falls, joint swelling and myalgias.   Gastrointestinal: Positive for abdominal pain, constipation, diarrhea, heartburn and nausea. Negative for bloating, flatus, hematemesis, hematochezia, melena and vomiting.   Genitourinary: Positive for dysuria, frequency and hematuria (dx with UTI 6 days ago, on abx). Negative for nocturia and urgency.   Neurological: Positive for dizziness and light-headedness. Negative for excessive daytime sleepiness, headaches and loss of balance.   Psychiatric/Behavioral: Negative for depression. The patient does not have insomnia and is not  "nervous/anxious.            Objective:     Vitals:    10/23/18 1118 10/23/18 1120 10/23/18 1122   BP: 151/69 144/57 130/60   BP Location: Right arm Left arm Left arm   Patient Position: Sitting Sitting Standing   Pulse: 60  62   Resp: 16     Temp: 98.1 °F (36.7 °C)     TempSrc: Temporal Artery      SpO2: 97%     Weight: 59.8 kg (131 lb 12.8 oz)     Height: 157.5 cm (62\")           Physical Exam   Constitutional: She is oriented to person, place, and time. She appears well-developed and well-nourished. She is active and cooperative. No distress.   HENT:   Head: Normocephalic and atraumatic.   Mouth/Throat: Oropharynx is clear and moist.   Eyes: Pupils are equal, round, and reactive to light. Conjunctivae and EOM are normal.   Neck: Normal range of motion. Neck supple. No JVD present. No tracheal deviation present. No thyromegaly present.   Cardiovascular: Normal rate, regular rhythm, normal heart sounds and intact distal pulses.    Pulmonary/Chest: Effort normal and breath sounds normal.   Abdominal: Soft. Bowel sounds are normal. She exhibits no distension. There is no tenderness.   Musculoskeletal: Normal range of motion.   Neurological: She is alert and oriented to person, place, and time.   Skin: Skin is warm, dry and intact.   Psychiatric: She has a normal mood and affect. Her behavior is normal.   Nursing note and vitals reviewed.      Lab and Diagnostic Review:      Results for orders placed or performed during the hospital encounter of 10/23/18   Comprehensive Metabolic Panel   Result Value Ref Range    Glucose 98 70 - 100 mg/dL    BUN 23 9 - 23 mg/dL    Creatinine 1.04 0.60 - 1.30 mg/dL    Sodium 138 132 - 146 mmol/L    Potassium 4.1 3.5 - 5.5 mmol/L    Chloride 108 99 - 109 mmol/L    CO2 22.0 20.0 - 31.0 mmol/L    Calcium 9.6 8.7 - 10.4 mg/dL    Total Protein 6.5 5.7 - 8.2 g/dL    Albumin 4.18 3.20 - 4.80 g/dL    ALT (SGPT) 25 7 - 40 U/L    AST (SGOT) 27 0 - 33 U/L    Alkaline Phosphatase 61 25 - 100 U/L    " Total Bilirubin 0.6 0.3 - 1.2 mg/dL    eGFR Non African Amer 52 (L) >60 mL/min/1.73    Globulin 2.3 gm/dL    A/G Ratio 1.8 1.5 - 2.5 g/dL    BUN/Creatinine Ratio 22.1 7.0 - 25.0    Anion Gap 8.0 3.0 - 11.0 mmol/L   BNP   Result Value Ref Range    BNP 73.0 0.0 - 100.0 pg/mL   CBC Auto Differential   Result Value Ref Range    WBC 5.17 3.50 - 10.80 10*3/mm3    RBC 4.15 3.89 - 5.14 10*6/mm3    Hemoglobin 13.2 11.5 - 15.5 g/dL    Hematocrit 40.3 34.5 - 44.0 %    MCV 97.1 80.0 - 99.0 fL    MCH 31.8 (H) 27.0 - 31.0 pg    MCHC 32.8 32.0 - 36.0 g/dL    RDW 12.4 11.3 - 14.5 %    RDW-SD 43.9 37.0 - 54.0 fl    MPV 9.7 6.0 - 12.0 fL    Platelets 276 150 - 450 10*3/mm3    Neutrophil % 59.4 41.0 - 71.0 %    Lymphocyte % 29.0 24.0 - 44.0 %    Monocyte % 8.7 0.0 - 12.0 %    Eosinophil % 1.7 0.0 - 3.0 %    Basophil % 1.0 0.0 - 1.0 %    Immature Grans % 0.2 0.0 - 0.6 %    Neutrophils, Absolute 3.07 1.50 - 8.30 10*3/mm3    Lymphocytes, Absolute 1.50 0.60 - 4.80 10*3/mm3    Monocytes, Absolute 0.45 0.00 - 1.00 10*3/mm3    Eosinophils, Absolute 0.09 0.00 - 0.30 10*3/mm3    Basophils, Absolute 0.05 0.00 - 0.20 10*3/mm3    Immature Grans, Absolute 0.01 0.00 - 0.03 10*3/mm3   D-dimer, Quantitative   Result Value Ref Range    D-Dimer, Quantitative 0.25 0.00 - 0.50 mg/L (FEU)   Urinalysis With Microscopic If Indicated (No Culture) - Urine, Clean Catch   Result Value Ref Range    Color, UA Yellow Yellow, Straw    Appearance, UA Turbid (A) Clear    pH, UA <=5.0 5.0 - 8.0    Specific Gravity, UA 1.011 1.001 - 1.030    Glucose, UA Negative Negative    Ketones, UA Negative Negative    Bilirubin, UA Negative Negative    Blood, UA Negative Negative    Protein, UA Negative Negative    Leuk Esterase, UA Small (1+) (A) Negative    Nitrite, UA Negative Negative    Urobilinogen, UA 0.2 E.U./dL 0.2 - 1.0 E.U./dL   Urinalysis, Microscopic Only - Urine, Clean Catch   Result Value Ref Range    RBC, UA 3-6 (A) None Seen, 0-2 /HPF    WBC, UA 0-2 None Seen, 0-2  /HPF    Bacteria, UA None Seen None Seen, Trace /HPF    Squamous Epithelial Cells, UA 0-2 None Seen, 0-2 /HPF    Hyaline Casts, UA 0-6 0 - 6 /LPF    Methodology Automated Microscopy    POC Troponin, Rapid   Result Value Ref Range    Troponin I 0.00 0.00 - 0.07 ng/mL   Light Blue Top   Result Value Ref Range    Extra Tube hold for add-on    Green Top (Gel)   Result Value Ref Range    Extra Tube Hold for add-ons.    Lavender Top   Result Value Ref Range    Extra Tube hold for add-on    Gold Top - SST   Result Value Ref Range    Extra Tube Hold for add-ons.    EKG: 10/23/18 NSR at 65 bpm       Assessment and Plan:         1. Precordial pain  KAYLEY score 1  - Treadmill Stress Test; Future    2. Essential hypertension  Under good control  HTN Education provided today including signs and symptoms, medication management, daily blood pressure monitoring. Patient encouraged to call the [facility] with any abnormal readings.   - Treadmill Stress Test; Future    3. Mixed hyperlipidemia  Currently on statin therapy  - Treadmill Stress Test; Future    4. Other fatigue    - Treadmill Stress Test; Future    5. MOORE (dyspnea on exertion)    - Treadmill Stress Test; Future    6. Palpitations    - Cardiac Event Monitor  - Adult Transthoracic Echo Complete W/ Cont if Necessary Per Protocol; Future    7. Tachycardia    - Cardiac Event Monitor  - Adult Transthoracic Echo Complete W/ Cont if Necessary Per Protocol; Future    8. Dizziness    - Cardiac Event Monitor  - Duplex Carotid Ultrasound CAR; Future    9. Bilateral carotid artery disease, unspecified type (CMS/HCC)    - Duplex Carotid Ultrasound CAR; Future    Ongoing plan of care pending results of above mentioned tests  It has been a pleasure to participate in the care of this patient.  Patient was instructed to call the [facility] with any questions, concerns, or worsening symptoms.    * Please note that portions of this note were completed with a voice  recognition program. Efforts were made to edit the dictation but occasionally words are transcribed.

## 2018-10-23 NOTE — ED PROVIDER NOTES
Subjective   Mary Saucedo is a 73 y.o.female who presents to the ED with complaints of palpitations and shortness of breath. The patient says that she woke up at 0430 this morning and went to the restroom. As she was walking back to her room, she became short of breath. According to her fitbit, her heart rate was 56 bpm while at rest this morning and jumped to 126 bpm during her episode. She endorses mild heaviness in her chest and neck during this episode as well. She has had episodes of shortness of breath in the past, but she had been unable to determine her heart beat during these episodes. Furthermore, she says that she was very fatigued and weak. She adds that she was diagnosed with a UTI at the [facility] last week. She says that here urine stream was weak and difficult to start.  She started bactrim 6 days ago. She also has a vaginal yeast infection and some leg swelling and leg pain. She has associated brown vaginal discharge. She also has a red spot on her left hand that recently developed. The patient also complains of a cough and she had nausea and diarrhea this morning. Currently, she feels much better than she did this morning. She denies having any recent abdominal pain, fever, or vaginal bleeding. She says that her doctor told her that she may have mild emphysema, but no testing has confirmed this diagnosis. She has taken many stress tests, but she has never been advised to have a cardiac catheterization. Her past medical history is significant for Sjogren's Syndrome. She is medicated with Plaquenil for this. There are no other acute complaints at this time.             History provided by:  Patient  Shortness of Breath   Severity:  Moderate  Onset quality:  Sudden  Duration:  1 hour  Timing:  Constant  Progression:  Improving  Chronicity:  New  Context comment:  Walking from room to room in her house  Relieved by:  None tried  Worsened by:  Nothing  Ineffective treatments:  None  tried  Associated symptoms: chest pain (heaviness), cough, neck pain (heaviness) and rash    Associated symptoms: no abdominal pain and no fever    Risk factors: no obesity        Review of Systems   Constitutional: Positive for fatigue. Negative for fever.   Respiratory: Positive for cough and shortness of breath.    Cardiovascular: Positive for chest pain (heaviness), palpitations and leg swelling.   Gastrointestinal: Positive for nausea. Negative for abdominal pain.   Genitourinary: Positive for vaginal discharge. Negative for vaginal bleeding.   Musculoskeletal: Positive for neck pain (heaviness).   Skin: Positive for color change and rash.   Neurological: Positive for weakness.   All other systems reviewed and are negative.      Past Medical History:   Diagnosis Date   • Arthritis    • Back pain    • Bronchitis     pcp diagnosed recently    • Cancer (CMS/HCC)     precancerous removed from nose    • Carotid stenosis, bilateral     Nonobstructive   • Cataracts, bilateral    • Chronic sinusitis    • Emphysema lung (CMS/HCC)    • GERD (gastroesophageal reflux disease)     no longer issue    • Heart murmur     at 18-   no longer real issue    • Hiatal hernia    • Hx: UTI (urinary tract infection)     back in oct 2016- no s/s currently    • Hyperlipidemia    • Hypertensive disorder    • Migraine     h/o    • PMB (postmenopausal bleeding)    • Sjoegren syndrome (CMS/HCC)    • Uterine fibroid    • Wears glasses    • Wrist fracture, left        Allergies   Allergen Reactions   • Hydrocodone Nausea And Vomiting   • Codeine Rash       Past Surgical History:   Procedure Laterality Date   • BELPHAROPTOSIS REPAIR Bilateral 1998   • BREAST LUMPECTOMY Right 1989   • BREAST LUMPECTOMY Left 07/2009   • CARDIOVASCULAR STRESS TEST      x3-4    • COLONOSCOPY      x5   • D&C HYSTEROSCOPY N/A 3/1/2017    Procedure: DILATATION AND CURETTAGE HYSTEROSCOPY;  Surgeon: Addis Mccord MD;  Location: [facility];  Service:    • ENDOSCOPY       x2   • HEMORRHOIDECTOMY  1976   • SKIN BIOPSY     • SKIN CANCER EXCISION      precancerous removed from nose    • WISDOM TOOTH EXTRACTION         Family History   Problem Relation Age of Onset   • Alzheimer's disease Mother 80   • Colon cancer Father 60   • Heart attack Father 40   • Emphysema Father    • Arthritis Sister    • Stomach cancer Paternal Grandfather    • Lactose intolerance Daughter         milk allergy       Social History     Social History   • Marital status:    • Number of children: 1     Occupational History   • Retired      Social History Main Topics   • Smoking status: Former Smoker     Packs/day: 0.25     Years: 10.00     Types: Cigarettes     Quit date: 2015   • Smokeless tobacco: Never Used   • Alcohol use Yes      Comment: occasional    • Drug use: No   • Sexual activity: No     Other Topics Concern   • Not on file     Social History Narrative    Caffeine:3 serving per week. Rest decaff only             Objective   Physical Exam   Constitutional: She is oriented to person, place, and time. She appears well-developed and well-nourished. No distress.   HENT:   Head: Normocephalic and atraumatic.   Nose: Nose normal.   Airway Patent. Pharynx benign.    Eyes: Conjunctivae are normal. No scleral icterus.   Neck: Normal range of motion. Neck supple. No JVD present. No thyromegaly present.   Cardiovascular: Normal rate, regular rhythm and normal heart sounds.    No murmur heard.  Pulmonary/Chest: Effort normal and breath sounds normal. No respiratory distress.   Abdominal: Soft. Bowel sounds are normal. There is no tenderness.   Musculoskeletal: Normal range of motion. She exhibits no edema.   She has redness on left thumb on the proximal phalanx.    Lymphadenopathy:     She has no cervical adenopathy.   Neurological: She is alert and oriented to person, place, and time.   Skin: Skin is warm and dry.   Psychiatric: Her behavior is normal. Her mood appears anxious.   Mildly anxious.     Nursing note and vitals reviewed.      Procedures         ED Course  ED Course as of Oct 23 1546   Tue Oct 23, 2018   1006 Revisited the patient to update her on the results of labs and imaging and plan for further care.   [TJ]   1013 Called the heart and vascular clinic. Left phone number for the to return call.   [TJ]   1022 Discussed the patient with the heart and vascular clinic and established an appointment for her to follow up today.   [TJ]   1023 Revisited the patient to update her on the appointment established with the [facility] clinic.   [TJ]   1026 [facility].  She or one of her colleagues can see her after DC from ED.  [LI]      ED Course User Index  [LI] Jorge Alberto Yang MD  [TJ] Edgar Cee     Recent Results (from the past 24 hour(s))   Comprehensive Metabolic Panel    Collection Time: 10/23/18  7:48 AM   Result Value Ref Range    Glucose 98 70 - 100 mg/dL    BUN 23 9 - 23 mg/dL    Creatinine 1.04 0.60 - 1.30 mg/dL    Sodium 138 132 - 146 mmol/L    Potassium 4.1 3.5 - 5.5 mmol/L    Chloride 108 99 - 109 mmol/L    CO2 22.0 20.0 - 31.0 mmol/L    Calcium 9.6 8.7 - 10.4 mg/dL    Total Protein 6.5 5.7 - 8.2 g/dL    Albumin 4.18 3.20 - 4.80 g/dL    ALT (SGPT) 25 7 - 40 U/L    AST (SGOT) 27 0 - 33 U/L    Alkaline Phosphatase 61 25 - 100 U/L    Total Bilirubin 0.6 0.3 - 1.2 mg/dL    eGFR Non African Amer 52 (L) >60 mL/min/1.73    Globulin 2.3 gm/dL    A/G Ratio 1.8 1.5 - 2.5 g/dL    BUN/Creatinine Ratio 22.1 7.0 - 25.0    Anion Gap 8.0 3.0 - 11.0 mmol/L   BNP    Collection Time: 10/23/18  7:48 AM   Result Value Ref Range    BNP 73.0 0.0 - 100.0 pg/mL   Light Blue Top    Collection Time: 10/23/18  7:48 AM   Result Value Ref Range    Extra Tube hold for add-on    Green Top (Gel)    Collection Time: 10/23/18  7:48 AM   Result Value Ref Range    Extra Tube Hold for add-ons.    Lavender Top    Collection Time: 10/23/18  7:48 AM   Result Value Ref Range     Extra Tube hold for add-on    Gold Top - SST    Collection Time: 10/23/18  7:48 AM   Result Value Ref Range    Extra Tube Hold for add-ons.    CBC Auto Differential    Collection Time: 10/23/18  7:48 AM   Result Value Ref Range    WBC 5.17 3.50 - 10.80 10*3/mm3    RBC 4.15 3.89 - 5.14 10*6/mm3    Hemoglobin 13.2 11.5 - 15.5 g/dL    Hematocrit 40.3 34.5 - 44.0 %    MCV 97.1 80.0 - 99.0 fL    MCH 31.8 (H) 27.0 - 31.0 pg    MCHC 32.8 32.0 - 36.0 g/dL    RDW 12.4 11.3 - 14.5 %    RDW-SD 43.9 37.0 - 54.0 fl    MPV 9.7 6.0 - 12.0 fL    Platelets 276 150 - 450 10*3/mm3    Neutrophil % 59.4 41.0 - 71.0 %    Lymphocyte % 29.0 24.0 - 44.0 %    Monocyte % 8.7 0.0 - 12.0 %    Eosinophil % 1.7 0.0 - 3.0 %    Basophil % 1.0 0.0 - 1.0 %    Immature Grans % 0.2 0.0 - 0.6 %    Neutrophils, Absolute 3.07 1.50 - 8.30 10*3/mm3    Lymphocytes, Absolute 1.50 0.60 - 4.80 10*3/mm3    Monocytes, Absolute 0.45 0.00 - 1.00 10*3/mm3    Eosinophils, Absolute 0.09 0.00 - 0.30 10*3/mm3    Basophils, Absolute 0.05 0.00 - 0.20 10*3/mm3    Immature Grans, Absolute 0.01 0.00 - 0.03 10*3/mm3   POC Troponin, Rapid    Collection Time: 10/23/18  7:53 AM   Result Value Ref Range    Troponin I 0.00 0.00 - 0.07 ng/mL   D-dimer, Quantitative    Collection Time: 10/23/18  8:27 AM   Result Value Ref Range    D-Dimer, Quantitative 0.25 0.00 - 0.50 mg/L (FEU)   Urinalysis With Microscopic If Indicated (No Culture) - Urine, Clean Catch    Collection Time: 10/23/18  8:30 AM   Result Value Ref Range    Color, UA Yellow Yellow, Straw    Appearance, UA Turbid (A) Clear    pH, UA <=5.0 5.0 - 8.0    Specific Gravity, UA 1.011 1.001 - 1.030    Glucose, UA Negative Negative    Ketones, UA Negative Negative    Bilirubin, UA Negative Negative    Blood, UA Negative Negative    Protein, UA Negative Negative    Leuk Esterase, UA Small (1+) (A) Negative    Nitrite, UA Negative Negative    Urobilinogen, UA 0.2 E.U./dL 0.2 - 1.0 E.U./dL   Urinalysis, Microscopic Only - Urine,  Clean Catch    Collection Time: 10/23/18  8:30 AM   Result Value Ref Range    RBC, UA 3-6 (A) None Seen, 0-2 /HPF    WBC, UA 0-2 None Seen, 0-2 /HPF    Bacteria, UA None Seen None Seen, Trace /HPF    Squamous Epithelial Cells, UA 0-2 None Seen, 0-2 /HPF    Hyaline Casts, UA 0-6 0 - 6 /LPF    Methodology Automated Microscopy    Treadmill Stress Test    Collection Time: 10/23/18  1:06 PM   Result Value Ref Range    BH CV STRESS PROTOCOL 1 Bridger     Stage 1 1     HR Stage 1 97     BP Stage 1 160/80     Duration Min Stage 1 3     Duration Sec Stage 1 0     Grade Stage 1 10     Speed Stage 1 1.7     BH CV STRESS METS STAGE 1 5     Stage 2 2     HR Stage 2 116     BP Stage 2 180/80     Duration Min Stage 2 3     Duration Sec Stage 2 0     Grade Stage 2 12     Speed Stage 2 2.5     BH CV STRESS METS STAGE 2 7.5     Stage 3 3     HR Stage 3 130     BP Stage 3 160/62     Duration Min Stage 3 1     Duration Sec Stage 3 29     Grade Stage 3 14     Speed Stage 3 3.4     BH CV STRESS METS STAGE 3 10.0     Baseline HR 65 bpm    Baseline /90 mmHg    Peak  bpm    Percent Max Pred HR 89.12 %    Percent Target  %    Peak /80 mmHg    Recovery HR 80 bpm    Recovery /80 mmHg    Target HR (85%) 125 bpm    Max. Pred. HR (100%) 147 bpm    Exercise duration (min) 7 min    Exercise duration (sec) 29 sec    Estimated workload 9.2 METS     Note: In addition to lab results from this visit, the labs listed above may include labs taken at another facility or during a different encounter within the last 24 hours. Please correlate lab times with ED admission and discharge times for further clarification of the services performed during this visit.    XR Chest 1 View   Preliminary Result   Heart shadow is upper limits of normal size. Chronic   appearing lung changes. No clearly acute chest disease is seen.       D:  10/23/2018   E:  10/23/2018            Vitals:    10/23/18 0714 10/23/18 1005   BP: 165/72 157/74   BP  "Location: Left arm    Pulse: 66 65   Resp: 18 16   Temp: 97.6 °F (36.4 °C)    TempSrc: Oral    SpO2: 98% 99%   Weight: 59.4 kg (131 lb)    Height: 157.5 cm (62\")      Medications   fluconazole (DIFLUCAN) tablet 150 mg (150 mg Oral Given 10/23/18 0838)     ECG/EMG Results (last 24 hours)     Procedure Component Value Units Date/Time    ECG 12 Lead [[id number]] Collected:  10/23/18 0725     Updated:  10/23/18 0744    Narrative:       Test Reason : SOA Protocol  Blood Pressure : **/** mmHG  Vent. Rate : 065 BPM     Atrial Rate : 065 BPM     P-R Int : 186 ms          QRS Dur : 088 ms      QT Int : 420 ms       P-R-T Axes : 056 049 036 degrees     QTc Int : 436 ms    Normal sinus rhythm  When compared with ECG of 28-FEB-2017 11:15,  No significant change was found  Confirmed by JORGE ALBERTO YANG MD ([id number]) on 10/23/2018 7:44:10 AM    Referred By:  IRAIDA           Confirmed By:JORGE ALBERTO YANG MD                        [facility]    Final diagnoses:   Dyspnea, unspecified type   Tachycardia       Documentation assistance provided by latrice Cee.  Information recorded by the scribe was done at my direction and has been verified and validated by me.     Edgar Cee  10/23/18 0806       Edgar Cee  10/23/18 1033       Jorge Alberto Yang MD  10/23/18 2536    "

## 2018-10-24 PROBLEM — R00.0 TACHYCARDIA: Status: ACTIVE | Noted: 2018-10-24

## 2018-10-24 PROBLEM — R42 DIZZINESS: Status: ACTIVE | Noted: 2018-10-24

## 2018-10-24 PROBLEM — I10 ESSENTIAL HYPERTENSION: Status: ACTIVE | Noted: 2018-10-24

## 2018-10-24 PROBLEM — R53.83 OTHER FATIGUE: Status: ACTIVE | Noted: 2018-10-24

## 2018-10-24 PROBLEM — R00.2 PALPITATIONS: Status: ACTIVE | Noted: 2018-10-24

## 2018-10-24 PROBLEM — R07.2 PRECORDIAL PAIN: Status: ACTIVE | Noted: 2018-10-24

## 2018-10-24 PROBLEM — E78.2 MIXED HYPERLIPIDEMIA: Status: ACTIVE | Noted: 2018-10-24

## 2018-10-24 PROBLEM — R06.09 DOE (DYSPNEA ON EXERTION): Status: ACTIVE | Noted: 2018-10-24

## 2018-10-25 ENCOUNTER — HOSPITAL ENCOUNTER (OUTPATIENT)
Dept: CARDIOLOGY | Facility: HOSPITAL | Age: 73
Discharge: HOME | End: 2018-10-25

## 2018-10-25 ENCOUNTER — HOSPITAL ENCOUNTER (OUTPATIENT)
Dept: CARDIOLOGY | Facility: HOSPITAL | Age: 73
Discharge: HOME | End: 2018-10-25
Admitting: NURSE PRACTITIONER

## 2018-10-25 VITALS — HEIGHT: 62 IN | BODY MASS INDEX: 24.11 KG/M2 | WEIGHT: 131 LBS

## 2018-10-25 DIAGNOSIS — R00.0 TACHYCARDIA: ICD-10-CM

## 2018-10-25 DIAGNOSIS — I77.9 BILATERAL CAROTID ARTERY DISEASE, UNSPECIFIED TYPE (HCC): ICD-10-CM

## 2018-10-25 DIAGNOSIS — R00.2 PALPITATIONS: ICD-10-CM

## 2018-10-25 DIAGNOSIS — R42 DIZZINESS: ICD-10-CM

## 2018-10-25 PROCEDURE — 93306 TTE W/DOPPLER COMPLETE: CPT

## 2018-10-25 PROCEDURE — 93880 EXTRACRANIAL BILAT STUDY: CPT | Performed by: INTERNAL MEDICINE

## 2018-10-25 PROCEDURE — 93880 EXTRACRANIAL BILAT STUDY: CPT

## 2018-10-25 PROCEDURE — 93306 TTE W/DOPPLER COMPLETE: CPT | Performed by: INTERNAL MEDICINE

## 2018-10-26 LAB
BH CV ECHO MEAS - AO MAX PG (FULL): 5.8 MMHG
BH CV ECHO MEAS - AO MAX PG: 12 MMHG
BH CV ECHO MEAS - AO MEAN PG (FULL): 3.2 MMHG
BH CV ECHO MEAS - AO MEAN PG: 6.3 MMHG
BH CV ECHO MEAS - AO ROOT AREA (BSA CORRECTED): 1.6
BH CV ECHO MEAS - AO ROOT AREA: 5.2 CM^2
BH CV ECHO MEAS - AO ROOT DIAM: 2.6 CM
BH CV ECHO MEAS - AO V2 MAX: 169.7 CM/SEC
BH CV ECHO MEAS - AO V2 MEAN: 116.9 CM/SEC
BH CV ECHO MEAS - AO V2 VTI: 39.5 CM
BH CV ECHO MEAS - AVA(I,A): 2.1 CM^2
BH CV ECHO MEAS - AVA(I,D): 2.1 CM^2
BH CV ECHO MEAS - AVA(V,A): 2 CM^2
BH CV ECHO MEAS - AVA(V,D): 2 CM^2
BH CV ECHO MEAS - BSA(HAYCOCK): 1.6 M^2
BH CV ECHO MEAS - BSA(HAYCOCK): 1.6 M^2
BH CV ECHO MEAS - BSA: 1.6 M^2
BH CV ECHO MEAS - BSA: 1.6 M^2
BH CV ECHO MEAS - BZI_BMI: 24 KILOGRAMS/M^2
BH CV ECHO MEAS - BZI_BMI: 24 KILOGRAMS/M^2
BH CV ECHO MEAS - BZI_METRIC_HEIGHT: 157.5 CM
BH CV ECHO MEAS - BZI_METRIC_HEIGHT: 157.5 CM
BH CV ECHO MEAS - BZI_METRIC_WEIGHT: 59.4 KG
BH CV ECHO MEAS - BZI_METRIC_WEIGHT: 59.4 KG
BH CV ECHO MEAS - EDV(CUBED): 57 ML
BH CV ECHO MEAS - EDV(TEICH): 63.9 ML
BH CV ECHO MEAS - EF(CUBED): 71.9 %
BH CV ECHO MEAS - EF(TEICH): 64.3 %
BH CV ECHO MEAS - ESV(CUBED): 16 ML
BH CV ECHO MEAS - ESV(TEICH): 22.8 ML
BH CV ECHO MEAS - FS: 34.5 %
BH CV ECHO MEAS - IVS/LVPW: 0.92
BH CV ECHO MEAS - IVSD: 0.9 CM
BH CV ECHO MEAS - LA DIMENSION: 3.4 CM
BH CV ECHO MEAS - LA/AO: 1.3
BH CV ECHO MEAS - LAD MAJOR: 5.7 CM
BH CV ECHO MEAS - LAT PEAK E' VEL: 10.7 CM/SEC
BH CV ECHO MEAS - LATERAL E/E' RATIO: 7.8
BH CV ECHO MEAS - LV MASS(C)D: 108.7 GRAMS
BH CV ECHO MEAS - LV MASS(C)DI: 68.1 GRAMS/M^2
BH CV ECHO MEAS - LV MAX PG: 6.2 MMHG
BH CV ECHO MEAS - LV MEAN PG: 3.1 MMHG
BH CV ECHO MEAS - LV V1 MAX: 124 CM/SEC
BH CV ECHO MEAS - LV V1 MEAN: 81.5 CM/SEC
BH CV ECHO MEAS - LV V1 VTI: 30.7 CM
BH CV ECHO MEAS - LVIDD: 3.8 CM
BH CV ECHO MEAS - LVIDS: 2.5 CM
BH CV ECHO MEAS - LVOT AREA (M): 2.8 CM^2
BH CV ECHO MEAS - LVOT AREA: 2.7 CM^2
BH CV ECHO MEAS - LVOT DIAM: 1.9 CM
BH CV ECHO MEAS - LVPWD: 0.97 CM
BH CV ECHO MEAS - MED PEAK E' VEL: 6.8 CM/SEC
BH CV ECHO MEAS - MEDIAL E/E' RATIO: 12.1
BH CV ECHO MEAS - MV A MAX VEL: 109.8 CM/SEC
BH CV ECHO MEAS - MV DEC TIME: 0.24 SEC
BH CV ECHO MEAS - MV E MAX VEL: 84.5 CM/SEC
BH CV ECHO MEAS - MV E/A: 0.77
BH CV ECHO MEAS - PA ACC SLOPE: 876.1 CM/SEC^2
BH CV ECHO MEAS - PA ACC TIME: 0.1 SEC
BH CV ECHO MEAS - PA MAX PG: 8 MMHG
BH CV ECHO MEAS - PA PR(ACCEL): 33.1 MMHG
BH CV ECHO MEAS - PA V2 MAX: 141.2 CM/SEC
BH CV ECHO MEAS - PI END-D VEL: 96.6 CM/SEC
BH CV ECHO MEAS - RAP SYSTOLE: 3 MMHG
BH CV ECHO MEAS - RVSP: 18 MMHG
BH CV ECHO MEAS - SI(AO): 129.2 ML/M^2
BH CV ECHO MEAS - SI(CUBED): 25.7 ML/M^2
BH CV ECHO MEAS - SI(LVOT): 52.4 ML/M^2
BH CV ECHO MEAS - SI(TEICH): 25.7 ML/M^2
BH CV ECHO MEAS - SV(AO): 206.3 ML
BH CV ECHO MEAS - SV(CUBED): 41 ML
BH CV ECHO MEAS - SV(LVOT): 83.7 ML
BH CV ECHO MEAS - SV(TEICH): 41.1 ML
BH CV ECHO MEAS - TAPSE (>1.6): 2.3 CM2
BH CV ECHO MEAS - TR MAX PG: 15 MMHG
BH CV ECHO MEAS - TR MAX VEL: 190 CM/SEC
BH CV ECHO MEASUREMENTS AVERAGE E/E' RATIO: 9.66
BH CV VAS BP RIGHT ARM: NORMAL MMHG
BH CV XLRA - RV BASE: 3.3 CM
BH CV XLRA - RV LENGTH: 6.8 CM
BH CV XLRA - RV MID: 2.6 CM
BH CV XLRA - TDI S': 10.3 CM/SEC
BH CV XLRA MEAS LEFT CCA RATIO VEL: 67.9 CM/SEC
BH CV XLRA MEAS LEFT DIST CCA EDV: 17.6 CM/SEC
BH CV XLRA MEAS LEFT DIST CCA PSV: 68.5 CM/SEC
BH CV XLRA MEAS LEFT DIST ICA EDV: 25.8 CM/SEC
BH CV XLRA MEAS LEFT DIST ICA PSV: 89.3 CM/SEC
BH CV XLRA MEAS LEFT ICA RATIO VEL: 71.6 CM/SEC
BH CV XLRA MEAS LEFT ICA/CCA RATIO: 1.1
BH CV XLRA MEAS LEFT MID CCA EDV: 22 CM/SEC
BH CV XLRA MEAS LEFT MID CCA PSV: 84.2 CM/SEC
BH CV XLRA MEAS LEFT MID ICA EDV: 23 CM/SEC
BH CV XLRA MEAS LEFT MID ICA PSV: 72.2 CM/SEC
BH CV XLRA MEAS LEFT PROX CCA EDV: 22.6 CM/SEC
BH CV XLRA MEAS LEFT PROX CCA PSV: 85.5 CM/SEC
BH CV XLRA MEAS LEFT PROX ECA PSV: 91.1 CM/SEC
BH CV XLRA MEAS LEFT PROX ICA EDV: 16.3 CM/SEC
BH CV XLRA MEAS LEFT PROX ICA PSV: 64.7 CM/SEC
BH CV XLRA MEAS LEFT PROX SCLA PSV: 105.5 CM/SEC
BH CV XLRA MEAS LEFT VERTEBRAL A EDV: 16.3 CM/SEC
BH CV XLRA MEAS LEFT VERTEBRAL A PSV: 90.5 CM/SEC
BH CV XLRA MEAS RIGHT CCA RATIO VEL: 84.5 CM/SEC
BH CV XLRA MEAS RIGHT DIST CCA EDV: 24.6 CM/SEC
BH CV XLRA MEAS RIGHT DIST CCA PSV: 85.4 CM/SEC
BH CV XLRA MEAS RIGHT DIST ICA EDV: 41.3 CM/SEC
BH CV XLRA MEAS RIGHT DIST ICA PSV: 108 CM/SEC
BH CV XLRA MEAS RIGHT ICA RATIO VEL: 91.3 CM/SEC
BH CV XLRA MEAS RIGHT ICA/CCA RATIO: 1.1
BH CV XLRA MEAS RIGHT MID CCA EDV: 29.5 CM/SEC
BH CV XLRA MEAS RIGHT MID CCA PSV: 94.3 CM/SEC
BH CV XLRA MEAS RIGHT MID ICA EDV: 32.4 CM/SEC
BH CV XLRA MEAS RIGHT MID ICA PSV: 88.4 CM/SEC
BH CV XLRA MEAS RIGHT PROX CCA EDV: 24.6 CM/SEC
BH CV XLRA MEAS RIGHT PROX CCA PSV: 107.1 CM/SEC
BH CV XLRA MEAS RIGHT PROX ECA PSV: 108 CM/SEC
BH CV XLRA MEAS RIGHT PROX ICA EDV: 21.6 CM/SEC
BH CV XLRA MEAS RIGHT PROX ICA PSV: 92.3 CM/SEC
BH CV XLRA MEAS RIGHT PROX SCLA PSV: 148.3 CM/SEC
BH CV XLRA MEAS RIGHT VERTEBRAL A EDV: 18.7 CM/SEC
BH CV XLRA MEAS RIGHT VERTEBRAL A PSV: 89.4 CM/SEC
LEFT ATRIUM VOLUME INDEX: 39.5 ML/M^2
MAXIMAL PREDICTED HEART RATE: 147 BPM
STRESS TARGET HR: 125 BPM

## 2018-10-30 ENCOUNTER — TELEPHONE (OUTPATIENT)
Dept: CARDIOLOGY | Facility: HOSPITAL | Age: 73
End: 2018-10-30

## 2019-01-03 ENCOUNTER — APPOINTMENT (OUTPATIENT)
Dept: PREADMISSION TESTING | Facility: HOSPITAL | Age: 74
End: 2019-01-03

## 2020-01-03 ENCOUNTER — HOSPITAL ENCOUNTER (EMERGENCY)
Facility: HOSPITAL | Age: 75
Discharge: HOME | End: 2020-01-03
Attending: EMERGENCY MEDICINE | Admitting: EMERGENCY MEDICINE

## 2020-01-03 ENCOUNTER — APPOINTMENT (OUTPATIENT)
Dept: GENERAL RADIOLOGY | Facility: HOSPITAL | Age: 75
End: 2020-01-03

## 2020-01-03 VITALS
HEART RATE: 86 BPM | DIASTOLIC BLOOD PRESSURE: 84 MMHG | BODY MASS INDEX: 24.44 KG/M2 | SYSTOLIC BLOOD PRESSURE: 195 MMHG | TEMPERATURE: 97.8 F | WEIGHT: 132.8 LBS | RESPIRATION RATE: 14 BRPM | HEIGHT: 62 IN | OXYGEN SATURATION: 97 %

## 2020-01-03 DIAGNOSIS — R07.9 CHEST PAIN, UNSPECIFIED TYPE: Primary | ICD-10-CM

## 2020-01-03 LAB
ALBUMIN SERPL-MCNC: 4.4 G/DL (ref 3.5–5.2)
ALBUMIN/GLOB SERPL: 1.6 G/DL
ALP SERPL-CCNC: 76 U/L (ref 39–117)
ALT SERPL W P-5'-P-CCNC: 15 U/L (ref 1–33)
ANION GAP SERPL CALCULATED.3IONS-SCNC: 12 MMOL/L (ref 5–15)
AST SERPL-CCNC: 21 U/L (ref 1–32)
BASOPHILS # BLD AUTO: 0.05 10*3/MM3 (ref 0–0.2)
BASOPHILS NFR BLD AUTO: 1 % (ref 0–1.5)
BILIRUB SERPL-MCNC: 0.4 MG/DL (ref 0.2–1.2)
BUN BLD-MCNC: 21 MG/DL (ref 8–23)
BUN/CREAT SERPL: 29.6 (ref 7–25)
CALCIUM SPEC-SCNC: 10 MG/DL (ref 8.6–10.5)
CHLORIDE SERPL-SCNC: 106 MMOL/L (ref 98–107)
CO2 SERPL-SCNC: 24 MMOL/L (ref 22–29)
CREAT BLD-MCNC: 0.71 MG/DL (ref 0.57–1)
DEPRECATED RDW RBC AUTO: 46.9 FL (ref 37–54)
EOSINOPHIL # BLD AUTO: 0.11 10*3/MM3 (ref 0–0.4)
EOSINOPHIL NFR BLD AUTO: 2.1 % (ref 0.3–6.2)
ERYTHROCYTE [DISTWIDTH] IN BLOOD BY AUTOMATED COUNT: 12.8 % (ref 12.3–15.4)
GFR SERPL CREATININE-BSD FRML MDRD: 80 ML/MIN/1.73
GLOBULIN UR ELPH-MCNC: 2.7 GM/DL
GLUCOSE BLD-MCNC: 110 MG/DL (ref 65–99)
HCT VFR BLD AUTO: 39.3 % (ref 34–46.6)
HGB BLD-MCNC: 13.2 G/DL (ref 12–15.9)
HOLD SPECIMEN: NORMAL
HOLD SPECIMEN: NORMAL
IMM GRANULOCYTES # BLD AUTO: 0.01 10*3/MM3 (ref 0–0.05)
IMM GRANULOCYTES NFR BLD AUTO: 0.2 % (ref 0–0.5)
LIPASE SERPL-CCNC: 33 U/L (ref 13–60)
LYMPHOCYTES # BLD AUTO: 1.84 10*3/MM3 (ref 0.7–3.1)
LYMPHOCYTES NFR BLD AUTO: 35 % (ref 19.6–45.3)
MCH RBC QN AUTO: 33.4 PG (ref 26.6–33)
MCHC RBC AUTO-ENTMCNC: 33.6 G/DL (ref 31.5–35.7)
MCV RBC AUTO: 99.5 FL (ref 79–97)
MONOCYTES # BLD AUTO: 0.4 10*3/MM3 (ref 0.1–0.9)
MONOCYTES NFR BLD AUTO: 7.6 % (ref 5–12)
NEUTROPHILS # BLD AUTO: 2.84 10*3/MM3 (ref 1.7–7)
NEUTROPHILS NFR BLD AUTO: 54.1 % (ref 42.7–76)
NRBC BLD AUTO-RTO: 0 /100 WBC (ref 0–0.2)
NT-PROBNP SERPL-MCNC: 201.2 PG/ML (ref 5–900)
PLATELET # BLD AUTO: 296 10*3/MM3 (ref 140–450)
PMV BLD AUTO: 9.2 FL (ref 6–12)
POTASSIUM BLD-SCNC: 3.9 MMOL/L (ref 3.5–5.2)
PROT SERPL-MCNC: 7.1 G/DL (ref 6–8.5)
RBC # BLD AUTO: 3.95 10*6/MM3 (ref 3.77–5.28)
SODIUM BLD-SCNC: 142 MMOL/L (ref 136–145)
TROPONIN T SERPL-MCNC: <0.01 NG/ML (ref 0–0.03)
WBC NRBC COR # BLD: 5.25 10*3/MM3 (ref 3.4–10.8)
WHOLE BLOOD HOLD SPECIMEN: NORMAL
WHOLE BLOOD HOLD SPECIMEN: NORMAL

## 2020-01-03 PROCEDURE — 83690 ASSAY OF LIPASE: CPT

## 2020-01-03 PROCEDURE — 99283 EMERGENCY DEPT VISIT LOW MDM: CPT

## 2020-01-03 PROCEDURE — 84484 ASSAY OF TROPONIN QUANT: CPT

## 2020-01-03 PROCEDURE — 85025 COMPLETE CBC W/AUTO DIFF WBC: CPT

## 2020-01-03 PROCEDURE — 80053 COMPREHEN METABOLIC PANEL: CPT

## 2020-01-03 PROCEDURE — 71045 X-RAY EXAM CHEST 1 VIEW: CPT

## 2020-01-03 PROCEDURE — 83880 ASSAY OF NATRIURETIC PEPTIDE: CPT

## 2020-01-03 PROCEDURE — 93005 ELECTROCARDIOGRAM TRACING: CPT

## 2020-01-03 PROCEDURE — 93005 ELECTROCARDIOGRAM TRACING: CPT | Performed by: EMERGENCY MEDICINE

## 2020-01-03 RX ORDER — SODIUM CHLORIDE 0.9 % (FLUSH) 0.9 %
10 SYRINGE (ML) INJECTION AS NEEDED
Status: DISCONTINUED | OUTPATIENT
Start: 2020-01-03 | End: 2020-01-04 | Stop reason: HOSPADM

## 2020-01-03 RX ORDER — ASPIRIN 81 MG/1
324 TABLET, CHEWABLE ORAL ONCE
Status: DISCONTINUED | OUTPATIENT
Start: 2020-01-03 | End: 2020-01-04 | Stop reason: HOSPADM

## 2020-01-04 NOTE — ED PROVIDER NOTES
Subjective   Mary Saucedo is a 74 y.o female who presents to the ED with complaints of chest pain. The patient reports she began experiencing substernal chest pain with an onset yesterday morning. She states she has been experiencing the chest pain intermittently since this time. She describes it as a heavy and dull sensation that radiates into her left arm, mid-back, and right neck. Additionally, her chest pain has been accompanied by nausea, shortness of breath, and headache. No fever. She has a past medical history of arthritis, carotid stenosis, GERD, hiatal hernia, hyperlipidemia, hypertension, and Sjoegren syndrome. No history of cardiac disease. There are no other acute symptoms at this time.      History provided by:  Patient  Chest Pain   Pain location:  Substernal area  Pain quality comment:  Heavy, dull  Pain radiates to:  Neck, L arm and mid back  Pain severity:  Moderate  Onset quality:  Sudden  Duration:  1 day  Timing:  Intermittent  Progression:  Unchanged  Chronicity:  New  Worsened by:  Nothing  Associated symptoms: headache, nausea and shortness of breath    Associated symptoms: no fever    Headaches:     Severity:  Mild    Onset quality:  Sudden    Timing:  Constant    Progression:  Unchanged  Nausea:     Severity:  Mild    Onset quality:  Sudden    Timing:  Constant    Progression:  Resolved  Shortness of breath:     Severity:  Mild    Onset quality:  Sudden    Timing:  Constant    Progression:  Unchanged  Risk factors: high cholesterol and hypertension    Risk factors: no prior DVT/PE        Review of Systems   Constitutional: Negative for fever.   Respiratory: Positive for shortness of breath.    Cardiovascular: Positive for chest pain.   Gastrointestinal: Positive for nausea.   Neurological: Positive for headaches.   All other systems reviewed and are negative.      Past Medical History:   Diagnosis Date   • Arthritis    • Back pain    • Bronchitis     pcp diagnosed recently    • Cancer  (CMS/HCC)     precancerous removed from nose    • Carotid stenosis, bilateral     Nonobstructive   • Cataracts, bilateral    • Chronic sinusitis    • Emphysema lung (CMS/HCC)    • GERD (gastroesophageal reflux disease)     no longer issue    • Heart murmur     at 18-   no longer real issue    • Hiatal hernia    • Hx: UTI (urinary tract infection)     back in oct 2016- no s/s currently    • Hyperlipidemia    • Hypertensive disorder    • Migraine     h/o    • PMB (postmenopausal bleeding)    • Sjoegren syndrome (CMS/HCC)    • Uterine fibroid    • Wears glasses    • Wrist fracture, left        Allergies   Allergen Reactions   • Hydrocodone Nausea And Vomiting   • Codeine Rash       Past Surgical History:   Procedure Laterality Date   • BELPHAROPTOSIS REPAIR Bilateral    • BREAST LUMPECTOMY Right    • BREAST LUMPECTOMY Left 2009   • CARDIOVASCULAR STRESS TEST      x3-4    • COLONOSCOPY      x5   • D&C HYSTEROSCOPY N/A 3/1/2017    Procedure: DILATATION AND CURETTAGE HYSTEROSCOPY;  Surgeon: Addis Mccord MD;  [facility]: [facility];  Service:    • ENDOSCOPY      x2   • HEMORRHOIDECTOMY     • SKIN BIOPSY     • SKIN CANCER EXCISION      precancerous removed from nose    • WISDOM TOOTH EXTRACTION         Family History   Problem Relation Age of Onset   • Alzheimer's disease Mother 80   • Colon cancer Father 60   • Heart attack Father 40   • Emphysema Father    • Arthritis Sister    • Stomach cancer Paternal Grandfather    • Lactose intolerance Daughter         milk allergy       Social History     Socioeconomic History   • Marital status:      Spouse name: Not on file   • Number of children: 1   • Years of education: Not on file   • Highest education level: Not on file   Occupational History   • Occupation: Retired   Tobacco Use   • Smoking status: Former Smoker     Packs/day: 0.25     Years: 10.00     Pack years: 2.50     Types: Cigarettes     Last attempt to quit: 2015     Years since quittin.0    • Smokeless tobacco: Never Used   Substance and Sexual Activity   • Alcohol use: Yes     Comment: occasional    • Drug use: No   • Sexual activity: Never   Social History Narrative    Caffeine:3 serving per week. Rest decaff only         Objective   Physical Exam   Constitutional: She is oriented to person, place, and time. She appears well-developed and well-nourished. No distress.   HENT:   Head: Normocephalic and atraumatic.   Nose: Nose normal.   Eyes: Conjunctivae are normal. No scleral icterus.   Neck: Normal range of motion. Neck supple.   Cardiovascular: Normal rate, regular rhythm and normal heart sounds.   No murmur heard.  Pulmonary/Chest: Effort normal and breath sounds normal. No respiratory distress.   Abdominal: Soft. There is no tenderness.   Musculoskeletal: Normal range of motion. She exhibits no edema.   Neurological: She is alert and oriented to person, place, and time.   Skin: Skin is warm and dry.   Psychiatric: She has a normal mood and affect. Her behavior is normal.   Nursing note and vitals reviewed.      Procedures         ED Course     Recent Results (from the past 24 hour(s))   Troponin    Collection Time: 01/03/20  6:41 PM   Result Value Ref Range    Troponin T <0.010 0.000 - 0.030 ng/mL   Comprehensive Metabolic Panel    Collection Time: 01/03/20  6:41 PM   Result Value Ref Range    Glucose 110 (H) 65 - 99 mg/dL    BUN 21 8 - 23 mg/dL    Creatinine 0.71 0.57 - 1.00 mg/dL    Sodium 142 136 - 145 mmol/L    Potassium 3.9 3.5 - 5.2 mmol/L    Chloride 106 98 - 107 mmol/L    CO2 24.0 22.0 - 29.0 mmol/L    Calcium 10.0 8.6 - 10.5 mg/dL    Total Protein 7.1 6.0 - 8.5 g/dL    Albumin 4.40 3.50 - 5.20 g/dL    ALT (SGPT) 15 1 - 33 U/L    AST (SGOT) 21 1 - 32 U/L    Alkaline Phosphatase 76 39 - 117 U/L    Total Bilirubin 0.4 0.2 - 1.2 mg/dL    eGFR Non African Amer 80 >60 mL/min/1.73    Globulin 2.7 gm/dL    A/G Ratio 1.6 g/dL    BUN/Creatinine Ratio 29.6 (H) 7.0 - 25.0    Anion Gap 12.0 5.0 -  15.0 mmol/L   Lipase    Collection Time: 01/03/20  6:41 PM   Result Value Ref Range    Lipase 33 13 - 60 U/L   BNP    Collection Time: 01/03/20  6:41 PM   Result Value Ref Range    proBNP 201.2 5.0 - 900.0 pg/mL   Light Blue Top    Collection Time: 01/03/20  6:41 PM   Result Value Ref Range    Extra Tube hold for add-on    Green Top (Gel)    Collection Time: 01/03/20  6:41 PM   Result Value Ref Range    Extra Tube Hold for add-ons.    Lavender Top    Collection Time: 01/03/20  6:41 PM   Result Value Ref Range    Extra Tube hold for add-on    Gold Top - SST    Collection Time: 01/03/20  6:41 PM   Result Value Ref Range    Extra Tube Hold for add-ons.    CBC Auto Differential    Collection Time: 01/03/20  6:41 PM   Result Value Ref Range    WBC 5.25 3.40 - 10.80 10*3/mm3    RBC 3.95 3.77 - 5.28 10*6/mm3    Hemoglobin 13.2 12.0 - 15.9 g/dL    Hematocrit 39.3 34.0 - 46.6 %    MCV 99.5 (H) 79.0 - 97.0 fL    MCH 33.4 (H) 26.6 - 33.0 pg    MCHC 33.6 31.5 - 35.7 g/dL    RDW 12.8 12.3 - 15.4 %    RDW-SD 46.9 37.0 - 54.0 fl    MPV 9.2 6.0 - 12.0 fL    Platelets 296 140 - 450 10*3/mm3    Neutrophil % 54.1 42.7 - 76.0 %    Lymphocyte % 35.0 19.6 - 45.3 %    Monocyte % 7.6 5.0 - 12.0 %    Eosinophil % 2.1 0.3 - 6.2 %    Basophil % 1.0 0.0 - 1.5 %    Immature Grans % 0.2 0.0 - 0.5 %    Neutrophils, Absolute 2.84 1.70 - 7.00 10*3/mm3    Lymphocytes, Absolute 1.84 0.70 - 3.10 10*3/mm3    Monocytes, Absolute 0.40 0.10 - 0.90 10*3/mm3    Eosinophils, Absolute 0.11 0.00 - 0.40 10*3/mm3    Basophils, Absolute 0.05 0.00 - 0.20 10*3/mm3    Immature Grans, Absolute 0.01 0.00 - 0.05 10*3/mm3    nRBC 0.0 0.0 - 0.2 /100 WBC     Note: In addition to lab results from this visit, the labs listed above may include labs taken at another facility or during a different encounter within the last 24 hours. Please correlate lab times with ED admission and discharge times for further clarification of the services performed during this visit.    XR  "Chest 1 View   Final Result   No acute cardiopulmonary findings.      Signer Name: GRIS Swartz MD    Signed: 1/3/2020 8:29 PM    Workstation Name: [id number]     [facility]        Vitals:    01/03/20 1834 01/03/20 1838   BP: (!) 195/84    BP Location: Left arm    Pulse: 86    Resp: 14    Temp: 97.8 °F (36.6 °C)    TempSrc: Oral    SpO2: 97%    Weight:  60.2 kg (132 lb 12.8 oz)   Height:  157.5 cm (62\")     Medications - No data to display  ECG/EMG Results (last 24 hours)     Procedure Component Value Units Date/Time    ECG 12 Lead [[id number]] Collected:  01/03/20 1839     Updated:  01/03/20 1839        ECG 12 Lead                           MDM  Number of Diagnoses or Management Options  Chest pain, unspecified type: new and requires workup  Diagnosis management comments: Patient presents with complaint of chest pain.  EKG and cardiac enzymes are negative.  I do feel stress is contributing to patient's symptoms.  She does admit to significantly increased stress recently.    She had previous negative cardiac work-up October of 2018.  She has no significant risk factors for coronary artery disease.    I will refer her to cardiology for further outpatient evaluation.    She will be discharged home appearing well with no complaints at this given time.       Amount and/or Complexity of Data Reviewed  Clinical lab tests: ordered and reviewed  Tests in the radiology section of CPT®: ordered and reviewed  Obtain history from someone other than the patient: yes  Review and summarize past medical records: yes  Independent visualization of images, tracings, or specimens: yes        Final diagnoses:   Chest pain, unspecified type       Documentation assistance provided by latrice Modi.  Information recorded by the scribe was done at my direction and has been verified and validated by me.     Keven Modi  01/03/20 0134       Aric Ochoa MD  01/04/20 9320    "

## 2020-01-04 NOTE — DISCHARGE INSTRUCTIONS
Follow-up with PCP for recheck within the next week.     Sleep with head elevated.     Avoid fatty,acidic, or spicy foods.     Use tylenol as needed for pain.

## 2020-01-15 ENCOUNTER — OFFICE VISIT (OUTPATIENT)
Dept: CARDIOLOGY | Facility: HOSPITAL | Age: 75
End: 2020-01-15

## 2020-01-15 VITALS
HEIGHT: 62 IN | RESPIRATION RATE: 18 BRPM | HEART RATE: 65 BPM | OXYGEN SATURATION: 98 % | WEIGHT: 132 LBS | TEMPERATURE: 97.9 F | BODY MASS INDEX: 24.29 KG/M2 | DIASTOLIC BLOOD PRESSURE: 64 MMHG | SYSTOLIC BLOOD PRESSURE: 137 MMHG

## 2020-01-15 DIAGNOSIS — E78.2 MIXED HYPERLIPIDEMIA: ICD-10-CM

## 2020-01-15 DIAGNOSIS — R06.09 DOE (DYSPNEA ON EXERTION): ICD-10-CM

## 2020-01-15 DIAGNOSIS — R07.2 PRECORDIAL PAIN: Primary | ICD-10-CM

## 2020-01-15 DIAGNOSIS — R53.83 OTHER FATIGUE: ICD-10-CM

## 2020-01-15 DIAGNOSIS — R94.31 ABNORMAL EKG: ICD-10-CM

## 2020-01-15 DIAGNOSIS — I10 ESSENTIAL HYPERTENSION: ICD-10-CM

## 2020-01-15 PROCEDURE — 99214 OFFICE O/P EST MOD 30 MIN: CPT | Performed by: NURSE PRACTITIONER

## 2020-01-15 RX ORDER — AZATHIOPRINE 50 MG/1
1 TABLET ORAL DAILY
COMMUNITY
End: 2022-08-17

## 2020-01-15 NOTE — PROGRESS NOTES
[facility]  [facility]      Encounter Date:01/15/2020     Mary PAT [facility] [id number]  [unfilled]    1945    Brianna Marques MD Brendgaurav Saucedo is a 74 y.o. female.      Subjective:     Chief Complaint:  Chest Pain and Follow-up       HPI 74-year-old female presents the office today for ongoing evaluation of her chest pain. She presented to [facility] ED on 1/3/2020 with complaints of substernal chest pain onset the day before.  Pain is described as heavy and dull with radiation to left arm mid back and right neck.  Patient reports left arm numbness with onset of chest pain and intermittent tingling in left fingers.  Pain is accompanied by nausea, dyspnea and headache.  She has past medical history of arthritis, carotid stenosis, GERD, hiatal hernia, hyperlipidemia hypertension and Sjogren's syndrome.  No history of cardiac disease.  GXT October 2018 was negative for ischemia.  Echo October 2018 showed an EF of 66 to 70% with grade 1 diastolic dysfunction.  Carotid duplex October 2018 shows mild stenosis; 0 to 49% right internal carotid artery as well as left internal carotid artery. Cardiac event monitor October 2018 showed an average heart rate of 73 bpm with no arrhythmias noted.       Patient Active Problem List   Diagnosis   • PMB (postmenopausal bleeding)   • Uterine mass   • Precordial pain   • Essential hypertension   • Mixed hyperlipidemia   • Other fatigue   • MOORE (dyspnea on exertion)   • Palpitations   • Tachycardia   • Dizziness       Past Medical History:   Diagnosis Date   • Arthritis    • Back pain    • Bronchitis     pcp diagnosed recently    • Cancer (CMS/HCC)     precancerous removed from nose    • Carotid stenosis, bilateral     Nonobstructive   • Cataracts, bilateral    • Chronic sinusitis    • Emphysema lung (CMS/HCC)    • GERD (gastroesophageal reflux disease)     no longer issue    • Heart murmur     at 18-   no  longer real issue    • Hiatal hernia    • Hx: UTI (urinary tract infection)     back in oct 2016- no s/s currently    • Hyperlipidemia    • Hypertensive disorder    • Migraine     h/o    • PMB (postmenopausal bleeding)    • Sjoegren syndrome    • Uterine fibroid    • Wears glasses    • Wrist fracture, left        Past Surgical History:   Procedure Laterality Date   • BELPHAROPTOSIS REPAIR Bilateral    • BREAST LUMPECTOMY Right    • BREAST LUMPECTOMY Left 2009   • CARDIOVASCULAR STRESS TEST      x3-4    • COLONOSCOPY      x5   • D&C HYSTEROSCOPY N/A 3/1/2017    Procedure: DILATATION AND CURETTAGE HYSTEROSCOPY;  Surgeon: Addis Mccord MD;  Location: [facility];  Service:    • ENDOSCOPY      x2   • HEMORRHOIDECTOMY     • SKIN BIOPSY     • SKIN CANCER EXCISION      precancerous removed from nose    • WISDOM TOOTH EXTRACTION         Family History   Problem Relation Age of Onset   • Alzheimer's disease Mother 80   • Colon cancer Father 60   • Heart attack Father 40   • Emphysema Father    • Arthritis Sister    • Stomach cancer Paternal Grandfather    • Lactose intolerance Daughter         milk allergy       Social History     Socioeconomic History   • Marital status:      Spouse name: Not on file   • Number of children: 1   • Years of education: Not on file   • Highest education level: Not on file   Occupational History   • Occupation: Retired   Tobacco Use   • Smoking status: Former Smoker     Packs/day: 0.25     Years: 10.00     Pack years: 2.50     Types: Cigarettes     Last attempt to quit:      Years since quittin.0   • Smokeless tobacco: Never Used   Substance and Sexual Activity   • Alcohol use: Yes     Comment: occasional    • Drug use: No   • Sexual activity: Never   Social History Narrative    Caffeine:3 serving per week. Rest decaff only       Allergies   Allergen Reactions   • Hydrocodone Nausea And Vomiting   • Codeine Rash         Current Outpatient Medications:   •   amLODIPine-benazepril (LOTREL) 10-20 MG per capsule, TK 1 C PO D, Disp: , Rfl: 11  •  atorvastatin (LIPITOR) 10 MG tablet, Take 10 mg by mouth Daily., Disp: , Rfl:   •  azaTHIOprine (IMURAN) 50 MG tablet, Take 1 tablet by mouth Daily., Disp: , Rfl:   •  hydroxychloroquine (PLAQUENIL) 200 MG tablet, Take 200 mg by mouth Daily. wilder, Disp: , Rfl: 3  •  acetaminophen (TYLENOL) 325 MG tablet, Take 2 tablets by mouth Every 4 (Four) Hours As Needed for mild pain (1-3)., Disp: 1 bottle, Rfl: 0  •  aspirin 81 MG EC tablet, Take 81 mg by mouth Daily. Stopped 2/21/17 per dr barr, Disp: , Rfl:   •  Cholecalciferol (VITAMIN D3) 2000 UNITS tablet, Take 1 capsule by mouth Daily., Disp: , Rfl:   •  fluticasone (FLONASE) 50 MCG/ACT nasal spray, 2 sprays into the nostril(s) as directed by provider Daily As Needed. havent taken in about 5 days, Disp: , Rfl:   •  hydrochlorothiazide (HYDRODIURIL) 25 MG tablet, TK 1 T PO D FOR BLOOD PRESSURE., Disp: , Rfl: 3  •  ibuprofen (ADVIL,MOTRIN) 600 MG tablet, Take 1 tablet by mouth Every 6 (Six) Hours As Needed for mild pain (1-3)., Disp: 60 tablet, Rfl: 0  •  naproxen sodium (ALEVE) 220 MG tablet, Take 220 mg by mouth 2 (Two) Times a Day As Needed., Disp: , Rfl:   •  oxybutynin XL (DITROPAN-XL) 10 MG 24 hr tablet, 10 mg Daily., Disp: , Rfl:   •  vitamin B-12 (CYANOCOBALAMIN) 1000 MCG tablet, Place 3,000 mcg under the tongue Daily., Disp: , Rfl:     The following portions of the patient's history were reviewed today and updated as appropriate: allergies, current medications, past family history, past medical history, past social history, past surgical history and problem list     Review of Systems   Constitution: Positive for malaise/fatigue. Negative for chills, decreased appetite, diaphoresis, fever, night sweats, weight gain and weight loss.   HENT: Positive for congestion. Negative for hearing loss, hoarse voice and nosebleeds.    Eyes: Negative for blurred vision, visual  "disturbance and visual halos.   Cardiovascular: Positive for chest pain and dyspnea on exertion. Negative for claudication, cyanosis, irregular heartbeat, leg swelling, near-syncope, orthopnea, palpitations, paroxysmal nocturnal dyspnea and syncope.   Respiratory: Positive for sputum production. Negative for cough, hemoptysis, shortness of breath, sleep disturbances due to breathing, snoring and wheezing.    Endocrine: Positive for cold intolerance and polyuria.   Hematologic/Lymphatic: Negative for bleeding problem. Does not bruise/bleed easily.   Skin: Negative for dry skin, itching and rash.   Musculoskeletal: Positive for joint pain. Negative for arthritis, falls, joint swelling and myalgias.   Gastrointestinal: Positive for heartburn. Negative for bloating, abdominal pain, constipation, diarrhea, flatus, hematemesis, hematochezia, melena, nausea and vomiting.   Genitourinary: Positive for frequency. Negative for dysuria, hematuria, nocturia and urgency.   Neurological: Negative for excessive daytime sleepiness, dizziness, headaches, light-headedness, loss of balance and weakness.   Psychiatric/Behavioral: Negative for depression. The patient does not have insomnia and is not nervous/anxious.        Objective:     Vitals:    01/15/20 0918   BP: 137/64   BP Location: Left arm   Patient Position: Sitting   Cuff Size: Adult   Pulse: 65   Resp: 18   Temp: 97.9 °F (36.6 °C)   TempSrc: Temporal   SpO2: 98%   Weight: 59.9 kg (132 lb)   Height: 157.5 cm (62\")     Body mass index is 24.14 kg/m².  Physical Exam   Constitutional: She is oriented to person, place, and time. She appears well-developed and well-nourished. She is active and cooperative. No distress.   HENT:   Head: Normocephalic and atraumatic.   Mouth/Throat: Oropharynx is clear and moist.   Eyes: Pupils are equal, round, and reactive to light. Conjunctivae and EOM are normal.   Neck: Normal range of motion. Neck supple. No JVD present. No tracheal deviation " present. No thyromegaly present.   Cardiovascular: Normal rate, regular rhythm, normal heart sounds and intact distal pulses.   Pulmonary/Chest: Effort normal and breath sounds normal.   Abdominal: Soft. Bowel sounds are normal. She exhibits no distension. There is no tenderness.   Musculoskeletal: Normal range of motion.   Neurological: She is alert and oriented to person, place, and time.   Skin: Skin is warm, dry and intact.   Psychiatric: She has a normal mood and affect. Her behavior is normal.   Nursing note and vitals reviewed.      Lab and Diagnostic Review:  Results for orders placed or performed during the hospital encounter of 01/03/20   Troponin   Result Value Ref Range    Troponin T <0.010 0.000 - 0.030 ng/mL   Comprehensive Metabolic Panel   Result Value Ref Range    Glucose 110 (H) 65 - 99 mg/dL    BUN 21 8 - 23 mg/dL    Creatinine 0.71 0.57 - 1.00 mg/dL    Sodium 142 136 - 145 mmol/L    Potassium 3.9 3.5 - 5.2 mmol/L    Chloride 106 98 - 107 mmol/L    CO2 24.0 22.0 - 29.0 mmol/L    Calcium 10.0 8.6 - 10.5 mg/dL    Total Protein 7.1 6.0 - 8.5 g/dL    Albumin 4.40 3.50 - 5.20 g/dL    ALT (SGPT) 15 1 - 33 U/L    AST (SGOT) 21 1 - 32 U/L    Alkaline Phosphatase 76 39 - 117 U/L    Total Bilirubin 0.4 0.2 - 1.2 mg/dL    eGFR Non African Amer 80 >60 mL/min/1.73    Globulin 2.7 gm/dL    A/G Ratio 1.6 g/dL    BUN/Creatinine Ratio 29.6 (H) 7.0 - 25.0    Anion Gap 12.0 5.0 - 15.0 mmol/L   Lipase   Result Value Ref Range    Lipase 33 13 - 60 U/L   BNP   Result Value Ref Range    proBNP 201.2 5.0 - 900.0 pg/mL   CBC Auto Differential   Result Value Ref Range    WBC 5.25 3.40 - 10.80 10*3/mm3    RBC 3.95 3.77 - 5.28 10*6/mm3    Hemoglobin 13.2 12.0 - 15.9 g/dL    Hematocrit 39.3 34.0 - 46.6 %    MCV 99.5 (H) 79.0 - 97.0 fL    MCH 33.4 (H) 26.6 - 33.0 pg    MCHC 33.6 31.5 - 35.7 g/dL    RDW 12.8 12.3 - 15.4 %    RDW-SD 46.9 37.0 - 54.0 fl    MPV 9.2 6.0 - 12.0 fL    Platelets 296 140 - 450 10*3/mm3    Neutrophil %  54.1 42.7 - 76.0 %    Lymphocyte % 35.0 19.6 - 45.3 %    Monocyte % 7.6 5.0 - 12.0 %    Eosinophil % 2.1 0.3 - 6.2 %    Basophil % 1.0 0.0 - 1.5 %    Immature Grans % 0.2 0.0 - 0.5 %    Neutrophils, Absolute 2.84 1.70 - 7.00 10*3/mm3    Lymphocytes, Absolute 1.84 0.70 - 3.10 10*3/mm3    Monocytes, Absolute 0.40 0.10 - 0.90 10*3/mm3    Eosinophils, Absolute 0.11 0.00 - 0.40 10*3/mm3    Basophils, Absolute 0.05 0.00 - 0.20 10*3/mm3    Immature Grans, Absolute 0.01 0.00 - 0.05 10*3/mm3    nRBC 0.0 0.0 - 0.2 /100 WBC     EKG: Normal sinus rhythm  Nonspecific ST abnormality  Abnormal ECG  When compared with ECG of 23-OCT-2018 07:25,  No significant change was found  Confirmed by LAURA HARTMANN ([id number]) on 1/11/2020 8:35:35 AM       Assessment and Plan:   1. Precordial pain  Roland score 1  - Stress Test With Myocardial Perfusion (1 Day); Future    2. MOORE (dyspnea on exertion)    - Stress Test With Myocardial Perfusion (1 Day); Future    3. Essential hypertension  Well controlled  HTN Education provided today including signs and symptoms, medication management, daily blood pressure monitoring. Patient encouraged to call the Heart and Valve center with any abnormal readings.   - Stress Test With Myocardial Perfusion (1 Day); Future    4. Mixed hyperlipidemia  Statin therapy  - Stress Test With Myocardial Perfusion (1 Day); Future    5. Other fatigue  - Stress Test With Myocardial Perfusion (1 Day); Future    6. Abnormal EKG    - Stress Test With Myocardial Perfusion (1 Day); Future          It has been a pleasure to participate in the care of this patient.  Patient was instructed to call the [facility] and Valve Center with any questions, concerns, or worsening symptoms.    *Please note that portions of this note were completed with a voice recognition program. Efforts were made to edit the dictations, but occasionally words are mistranscribed.

## 2020-01-16 ENCOUNTER — HOSPITAL ENCOUNTER (OUTPATIENT)
Dept: CARDIOLOGY | Facility: HOSPITAL | Age: 75
Discharge: HOME | End: 2020-01-16
Admitting: NURSE PRACTITIONER

## 2020-01-16 ENCOUNTER — HOSPITAL ENCOUNTER (OUTPATIENT)
Dept: CARDIOLOGY | Facility: HOSPITAL | Age: 75
Discharge: HOME | End: 2020-01-16

## 2020-01-16 VITALS — BODY MASS INDEX: 24.3 KG/M2 | HEIGHT: 62 IN | WEIGHT: 132.06 LBS

## 2020-01-16 DIAGNOSIS — I10 ESSENTIAL HYPERTENSION: ICD-10-CM

## 2020-01-16 DIAGNOSIS — R06.09 DOE (DYSPNEA ON EXERTION): ICD-10-CM

## 2020-01-16 DIAGNOSIS — R53.83 OTHER FATIGUE: ICD-10-CM

## 2020-01-16 DIAGNOSIS — E78.2 MIXED HYPERLIPIDEMIA: ICD-10-CM

## 2020-01-16 DIAGNOSIS — R94.31 ABNORMAL EKG: ICD-10-CM

## 2020-01-16 DIAGNOSIS — R07.2 PRECORDIAL PAIN: ICD-10-CM

## 2020-01-16 LAB
BH CV STRESS BP STAGE 1: NORMAL
BH CV STRESS BP STAGE 2: NORMAL
BH CV STRESS DURATION MIN STAGE 1: 3
BH CV STRESS DURATION MIN STAGE 2: 3
BH CV STRESS DURATION MIN STAGE 3: 1
BH CV STRESS DURATION SEC STAGE 1: 0
BH CV STRESS DURATION SEC STAGE 2: 0
BH CV STRESS DURATION SEC STAGE 3: 51
BH CV STRESS GRADE STAGE 1: 10
BH CV STRESS GRADE STAGE 2: 12
BH CV STRESS GRADE STAGE 3: 14
BH CV STRESS HR STAGE 1: 108
BH CV STRESS HR STAGE 2: 117
BH CV STRESS METS STAGE 1: 5
BH CV STRESS METS STAGE 2: 7.5
BH CV STRESS METS STAGE 3: 10
BH CV STRESS PROTOCOL 1: NORMAL
BH CV STRESS RECOVERY BP: NORMAL MMHG
BH CV STRESS RECOVERY HR: 78 BPM
BH CV STRESS SPEED STAGE 1: 1.7
BH CV STRESS SPEED STAGE 2: 2.5
BH CV STRESS SPEED STAGE 3: 3.4
BH CV STRESS STAGE 1: 1
BH CV STRESS STAGE 2: 2
BH CV STRESS STAGE 3: 3
LV EF NUC BP: 70 %
MAXIMAL PREDICTED HEART RATE: 146 BPM
PERCENT MAX PREDICTED HR: 86.99 %
STRESS BASELINE BP: NORMAL MMHG
STRESS BASELINE HR: 72 BPM
STRESS PERCENT HR: 102 %
STRESS POST ESTIMATED WORKLOAD: 8.9 METS
STRESS POST EXERCISE DUR MIN: 7 MIN
STRESS POST EXERCISE DUR SEC: 51 SEC
STRESS POST PEAK BP: NORMAL MMHG
STRESS POST PEAK HR: 127 BPM
STRESS TARGET HR: 124 BPM

## 2020-01-16 PROCEDURE — 0 TECHNETIUM SESTAMIBI: Performed by: NURSE PRACTITIONER

## 2020-01-16 PROCEDURE — 78452 HT MUSCLE IMAGE SPECT MULT: CPT

## 2020-01-16 PROCEDURE — A9500 TC99M SESTAMIBI: HCPCS | Performed by: NURSE PRACTITIONER

## 2020-01-16 PROCEDURE — 78452 HT MUSCLE IMAGE SPECT MULT: CPT | Performed by: INTERNAL MEDICINE

## 2020-01-16 PROCEDURE — 93018 CV STRESS TEST I&R ONLY: CPT | Performed by: INTERNAL MEDICINE

## 2020-01-16 PROCEDURE — 93017 CV STRESS TEST TRACING ONLY: CPT

## 2020-01-16 RX ADMIN — TECHNETIUM TC 99M SESTAMIBI 1 DOSE: 1 INJECTION INTRAVENOUS at 13:05

## 2020-01-16 RX ADMIN — TECHNETIUM TC 99M SESTAMIBI 1 DOSE: 1 INJECTION INTRAVENOUS at 15:10

## 2020-01-17 ENCOUNTER — TELEPHONE (OUTPATIENT)
Dept: CARDIOLOGY | Facility: HOSPITAL | Age: 75
End: 2020-01-17

## 2020-01-17 RX ORDER — ATORVASTATIN CALCIUM 20 MG/1
20 TABLET, FILM COATED ORAL DAILY
Qty: 90 TABLET | Refills: 3 | Status: SHIPPED | OUTPATIENT
Start: 2020-01-17

## 2020-01-17 NOTE — TELEPHONE ENCOUNTER
Reviewed stress test with patient, normal . Will increase lipitor to 20 mg daily. Rx sent to [facility]

## 2021-04-30 NOTE — DISCHARGE INSTRUCTIONS
Follow up with the Heart and valve clinic immediately after discharge as I have scheduled you to do so.     Immediately return to the ED for worsening or new concerning symptoms.    done

## 2022-08-08 ENCOUNTER — HOSPITAL ENCOUNTER (OUTPATIENT)
Facility: HOSPITAL | Age: 77
Setting detail: OBSERVATION
Discharge: HOME | End: 2022-08-09
Attending: STUDENT IN AN ORGANIZED HEALTH CARE EDUCATION/TRAINING PROGRAM | Admitting: INTERNAL MEDICINE

## 2022-08-08 ENCOUNTER — APPOINTMENT (OUTPATIENT)
Dept: GENERAL RADIOLOGY | Facility: HOSPITAL | Age: 77
End: 2022-08-08

## 2022-08-08 DIAGNOSIS — R00.2 PALPITATIONS: ICD-10-CM

## 2022-08-08 DIAGNOSIS — I25.119 CORONARY ARTERY DISEASE INVOLVING NATIVE HEART WITH ANGINA PECTORIS, UNSPECIFIED VESSEL OR LESION TYPE: ICD-10-CM

## 2022-08-08 DIAGNOSIS — R07.9 CHEST PAIN, UNSPECIFIED TYPE: Primary | ICD-10-CM

## 2022-08-08 DIAGNOSIS — E87.0 HYPERNATREMIA: ICD-10-CM

## 2022-08-08 LAB
ALBUMIN SERPL-MCNC: 4.4 G/DL (ref 3.5–5.2)
ALBUMIN/GLOB SERPL: 1.8 G/DL
ALP SERPL-CCNC: 67 U/L (ref 39–117)
ALT SERPL W P-5'-P-CCNC: 20 U/L (ref 1–33)
ANION GAP SERPL CALCULATED.3IONS-SCNC: 11 MMOL/L (ref 5–15)
AST SERPL-CCNC: 22 U/L (ref 1–32)
BASOPHILS # BLD AUTO: 0.04 10*3/MM3 (ref 0–0.2)
BASOPHILS NFR BLD AUTO: 0.8 % (ref 0–1.5)
BILIRUB SERPL-MCNC: 0.4 MG/DL (ref 0–1.2)
BUN SERPL-MCNC: 19 MG/DL (ref 8–23)
BUN/CREAT SERPL: 28.8 (ref 7–25)
CALCIUM SPEC-SCNC: 10.3 MG/DL (ref 8.6–10.5)
CHLORIDE SERPL-SCNC: 111 MMOL/L (ref 98–107)
CO2 SERPL-SCNC: 24 MMOL/L (ref 22–29)
CREAT SERPL-MCNC: 0.66 MG/DL (ref 0.57–1)
DEPRECATED RDW RBC AUTO: 42.6 FL (ref 37–54)
EGFRCR SERPLBLD CKD-EPI 2021: 90.5 ML/MIN/1.73
EOSINOPHIL # BLD AUTO: 0.19 10*3/MM3 (ref 0–0.4)
EOSINOPHIL NFR BLD AUTO: 3.6 % (ref 0.3–6.2)
ERYTHROCYTE [DISTWIDTH] IN BLOOD BY AUTOMATED COUNT: 12.3 % (ref 12.3–15.4)
GLOBULIN UR ELPH-MCNC: 2.4 GM/DL
GLUCOSE SERPL-MCNC: 105 MG/DL (ref 65–99)
HCT VFR BLD AUTO: 36.9 % (ref 34–46.6)
HGB BLD-MCNC: 13.1 G/DL (ref 12–15.9)
IMM GRANULOCYTES # BLD AUTO: 0.01 10*3/MM3 (ref 0–0.05)
IMM GRANULOCYTES NFR BLD AUTO: 0.2 % (ref 0–0.5)
LIPASE SERPL-CCNC: 45 U/L (ref 13–60)
LYMPHOCYTES # BLD AUTO: 2.08 10*3/MM3 (ref 0.7–3.1)
LYMPHOCYTES NFR BLD AUTO: 39.8 % (ref 19.6–45.3)
MCH RBC QN AUTO: 33.2 PG (ref 26.6–33)
MCHC RBC AUTO-ENTMCNC: 35.5 G/DL (ref 31.5–35.7)
MCV RBC AUTO: 93.7 FL (ref 79–97)
MONOCYTES # BLD AUTO: 0.46 10*3/MM3 (ref 0.1–0.9)
MONOCYTES NFR BLD AUTO: 8.8 % (ref 5–12)
NEUTROPHILS NFR BLD AUTO: 2.44 10*3/MM3 (ref 1.7–7)
NEUTROPHILS NFR BLD AUTO: 46.8 % (ref 42.7–76)
NRBC BLD AUTO-RTO: 0 /100 WBC (ref 0–0.2)
NT-PROBNP SERPL-MCNC: 160.5 PG/ML (ref 0–1800)
PLATELET # BLD AUTO: 227 10*3/MM3 (ref 140–450)
PMV BLD AUTO: 9.6 FL (ref 6–12)
POTASSIUM SERPL-SCNC: 4.2 MMOL/L (ref 3.5–5.2)
PROT SERPL-MCNC: 6.8 G/DL (ref 6–8.5)
RBC # BLD AUTO: 3.94 10*6/MM3 (ref 3.77–5.28)
SODIUM SERPL-SCNC: 146 MMOL/L (ref 136–145)
TROPONIN T SERPL-MCNC: <0.01 NG/ML (ref 0–0.03)
WBC NRBC COR # BLD: 5.22 10*3/MM3 (ref 3.4–10.8)

## 2022-08-08 PROCEDURE — 83880 ASSAY OF NATRIURETIC PEPTIDE: CPT | Performed by: STUDENT IN AN ORGANIZED HEALTH CARE EDUCATION/TRAINING PROGRAM

## 2022-08-08 PROCEDURE — 99285 EMERGENCY DEPT VISIT HI MDM: CPT

## 2022-08-08 PROCEDURE — 71045 X-RAY EXAM CHEST 1 VIEW: CPT

## 2022-08-08 PROCEDURE — 93005 ELECTROCARDIOGRAM TRACING: CPT | Performed by: STUDENT IN AN ORGANIZED HEALTH CARE EDUCATION/TRAINING PROGRAM

## 2022-08-08 PROCEDURE — 80053 COMPREHEN METABOLIC PANEL: CPT | Performed by: STUDENT IN AN ORGANIZED HEALTH CARE EDUCATION/TRAINING PROGRAM

## 2022-08-08 PROCEDURE — 84484 ASSAY OF TROPONIN QUANT: CPT | Performed by: STUDENT IN AN ORGANIZED HEALTH CARE EDUCATION/TRAINING PROGRAM

## 2022-08-08 PROCEDURE — 83690 ASSAY OF LIPASE: CPT | Performed by: STUDENT IN AN ORGANIZED HEALTH CARE EDUCATION/TRAINING PROGRAM

## 2022-08-08 PROCEDURE — 85025 COMPLETE CBC W/AUTO DIFF WBC: CPT

## 2022-08-08 PROCEDURE — 85379 FIBRIN DEGRADATION QUANT: CPT | Performed by: STUDENT IN AN ORGANIZED HEALTH CARE EDUCATION/TRAINING PROGRAM

## 2022-08-08 PROCEDURE — 93005 ELECTROCARDIOGRAM TRACING: CPT

## 2022-08-08 RX ORDER — SODIUM CHLORIDE 0.9 % (FLUSH) 0.9 %
10 SYRINGE (ML) INJECTION AS NEEDED
Status: DISCONTINUED | OUTPATIENT
Start: 2022-08-08 | End: 2022-08-09 | Stop reason: HOSPADM

## 2022-08-08 RX ORDER — ASPIRIN 81 MG/1
324 TABLET, CHEWABLE ORAL ONCE
Status: DISCONTINUED | OUTPATIENT
Start: 2022-08-08 | End: 2022-08-09

## 2022-08-09 ENCOUNTER — APPOINTMENT (OUTPATIENT)
Dept: CARDIOLOGY | Facility: HOSPITAL | Age: 77
End: 2022-08-09

## 2022-08-09 VITALS
DIASTOLIC BLOOD PRESSURE: 82 MMHG | TEMPERATURE: 97.4 F | OXYGEN SATURATION: 98 % | BODY MASS INDEX: 23.65 KG/M2 | HEIGHT: 62 IN | WEIGHT: 128.53 LBS | HEART RATE: 74 BPM | SYSTOLIC BLOOD PRESSURE: 167 MMHG | RESPIRATION RATE: 17 BRPM

## 2022-08-09 DIAGNOSIS — R42 DIZZINESS: Primary | ICD-10-CM

## 2022-08-09 PROBLEM — E87.0 HYPERNATREMIA: Status: ACTIVE | Noted: 2022-08-09

## 2022-08-09 PROBLEM — R07.9 CHEST PAIN: Status: ACTIVE | Noted: 2022-08-09

## 2022-08-09 PROBLEM — M35.00 SJOGREN'S DISEASE (HCC): Status: ACTIVE | Noted: 2022-08-09

## 2022-08-09 LAB
ANION GAP SERPL CALCULATED.3IONS-SCNC: 10 MMOL/L (ref 5–15)
APTT PPP: 32.4 SECONDS (ref 60–90)
ASCENDING AORTA: 2.9 CM
BASOPHILS # BLD AUTO: 0.05 10*3/MM3 (ref 0–0.2)
BASOPHILS # BLD AUTO: 0.05 10*3/MM3 (ref 0–0.2)
BASOPHILS NFR BLD AUTO: 0.8 % (ref 0–1.5)
BASOPHILS NFR BLD AUTO: 0.9 % (ref 0–1.5)
BH CV ECHO MEAS - AI P1/2T: 618.9 MSEC
BH CV ECHO MEAS - AO MAX PG: 9.2 MMHG
BH CV ECHO MEAS - AO MEAN PG: 5 MMHG
BH CV ECHO MEAS - AO ROOT DIAM: 2.9 CM
BH CV ECHO MEAS - AO V2 MAX: 152 CM/SEC
BH CV ECHO MEAS - AO V2 VTI: 36.1 CM
BH CV ECHO MEAS - AVA(I,D): 2.27 CM2
BH CV ECHO MEAS - EDV(CUBED): 103.8 ML
BH CV ECHO MEAS - EDV(MOD-SP2): 88.1 ML
BH CV ECHO MEAS - EDV(MOD-SP4): 78.1 ML
BH CV ECHO MEAS - EF(MOD-BP): 58.7 %
BH CV ECHO MEAS - EF(MOD-SP2): 60.2 %
BH CV ECHO MEAS - EF(MOD-SP4): 58.3 %
BH CV ECHO MEAS - ESV(CUBED): 32.8 ML
BH CV ECHO MEAS - ESV(MOD-SP2): 35.1 ML
BH CV ECHO MEAS - ESV(MOD-SP4): 32.6 ML
BH CV ECHO MEAS - FS: 31.9 %
BH CV ECHO MEAS - IVS/LVPW: 1.5 CM
BH CV ECHO MEAS - IVSD: 0.9 CM
BH CV ECHO MEAS - LA DIMENSION: 3.2 CM
BH CV ECHO MEAS - LAT PEAK E' VEL: 9.6 CM/SEC
BH CV ECHO MEAS - LV DIASTOLIC VOL/BSA (35-75): 49.4 CM2
BH CV ECHO MEAS - LV MASS(C)D: 112.5 GRAMS
BH CV ECHO MEAS - LV MAX PG: 5.6 MMHG
BH CV ECHO MEAS - LV MEAN PG: 3 MMHG
BH CV ECHO MEAS - LV SYSTOLIC VOL/BSA (12-30): 20.6 CM2
BH CV ECHO MEAS - LV V1 MAX: 118 CM/SEC
BH CV ECHO MEAS - LV V1 VTI: 26.1 CM
BH CV ECHO MEAS - LVIDD: 4.7 CM
BH CV ECHO MEAS - LVIDS: 3.2 CM
BH CV ECHO MEAS - LVOT AREA: 3.1 CM2
BH CV ECHO MEAS - LVOT DIAM: 2 CM
BH CV ECHO MEAS - LVPWD: 0.6 CM
BH CV ECHO MEAS - MED PEAK E' VEL: 7 CM/SEC
BH CV ECHO MEAS - MV A MAX VEL: 105 CM/SEC
BH CV ECHO MEAS - MV DEC SLOPE: 332 CM/SEC2
BH CV ECHO MEAS - MV DEC TIME: 0.25 MSEC
BH CV ECHO MEAS - MV E MAX VEL: 78.1 CM/SEC
BH CV ECHO MEAS - MV E/A: 0.74
BH CV ECHO MEAS - MV MAX PG: 5.2 MMHG
BH CV ECHO MEAS - MV MEAN PG: 2 MMHG
BH CV ECHO MEAS - MV P1/2T: 71.3 MSEC
BH CV ECHO MEAS - MV V2 VTI: 37.4 CM
BH CV ECHO MEAS - MVA(P1/2T): 3.1 CM2
BH CV ECHO MEAS - MVA(VTI): 2.19 CM2
BH CV ECHO MEAS - PA ACC TIME: 0.16 SEC
BH CV ECHO MEAS - PA PR(ACCEL): 9.3 MMHG
BH CV ECHO MEAS - PA V2 MAX: 99.6 CM/SEC
BH CV ECHO MEAS - PI END-D VEL: 89.8 CM/SEC
BH CV ECHO MEAS - RAP SYSTOLE: 3 MMHG
BH CV ECHO MEAS - RVSP: 23 MMHG
BH CV ECHO MEAS - SI(MOD-SP2): 33.5 ML/M2
BH CV ECHO MEAS - SI(MOD-SP4): 28.8 ML/M2
BH CV ECHO MEAS - SV(LVOT): 82 ML
BH CV ECHO MEAS - SV(MOD-SP2): 53 ML
BH CV ECHO MEAS - SV(MOD-SP4): 45.5 ML
BH CV ECHO MEAS - TAPSE (>1.6): 2.5 CM
BH CV ECHO MEAS - TR MAX PG: 20 MMHG
BH CV ECHO MEAS - TR MAX VEL: 221 CM/SEC
BH CV ECHO MEASUREMENTS AVERAGE E/E' RATIO: 9.41
BH CV REST NUCLEAR ISOTOPE DOSE: 30 MCI
BH CV STRESS BP STAGE 1: NORMAL
BH CV STRESS BP STAGE 3: NORMAL
BH CV STRESS COMMENTS STAGE 1: NORMAL
BH CV STRESS DOSE REGADENOSON STAGE 1: 0.4
BH CV STRESS DURATION MIN STAGE 1: 1
BH CV STRESS DURATION MIN STAGE 2: 1
BH CV STRESS DURATION MIN STAGE 3: 1
BH CV STRESS DURATION MIN STAGE 4: 1
BH CV STRESS DURATION SEC STAGE 1: 0
BH CV STRESS DURATION SEC STAGE 2: 0
BH CV STRESS DURATION SEC STAGE 3: 0
BH CV STRESS DURATION SEC STAGE 4: 0
BH CV STRESS HR STAGE 1: 89
BH CV STRESS HR STAGE 2: 90
BH CV STRESS HR STAGE 3: 90
BH CV STRESS HR STAGE 4: 87
BH CV STRESS NUCLEAR ISOTOPE DOSE: 29.9 MCI
BH CV STRESS O2 STAGE 1: 99
BH CV STRESS O2 STAGE 2: 99
BH CV STRESS O2 STAGE 3: 99
BH CV STRESS O2 STAGE 4: 98
BH CV STRESS PROTOCOL 1: NORMAL
BH CV STRESS RECOVERY BP: NORMAL MMHG
BH CV STRESS RECOVERY HR: 80 BPM
BH CV STRESS RECOVERY O2: 98 %
BH CV STRESS STAGE 1: 1
BH CV STRESS STAGE 2: 2
BH CV STRESS STAGE 3: 3
BH CV STRESS STAGE 4: 4
BH CV VAS BP LEFT ARM: NORMAL MMHG
BH CV XLRA - RV BASE: 3.8 CM
BH CV XLRA - RV LENGTH: 6.1 CM
BH CV XLRA - RV MID: 2.6 CM
BH CV XLRA - TDI S': 13.5 CM/SEC
BUN SERPL-MCNC: 13 MG/DL (ref 8–23)
BUN/CREAT SERPL: 22.4 (ref 7–25)
CALCIUM SPEC-SCNC: 9.6 MG/DL (ref 8.6–10.5)
CHLORIDE SERPL-SCNC: 113 MMOL/L (ref 98–107)
CHOLEST SERPL-MCNC: 148 MG/DL (ref 0–200)
CO2 SERPL-SCNC: 23 MMOL/L (ref 22–29)
CREAT SERPL-MCNC: 0.58 MG/DL (ref 0.57–1)
D DIMER PPP FEU-MCNC: 0.33 MCGFEU/ML (ref 0.01–0.5)
DEPRECATED RDW RBC AUTO: 41.7 FL (ref 37–54)
DEPRECATED RDW RBC AUTO: 43.2 FL (ref 37–54)
EGFRCR SERPLBLD CKD-EPI 2021: 93.3 ML/MIN/1.73
EOSINOPHIL # BLD AUTO: 0.16 10*3/MM3 (ref 0–0.4)
EOSINOPHIL # BLD AUTO: 0.16 10*3/MM3 (ref 0–0.4)
EOSINOPHIL NFR BLD AUTO: 2.4 % (ref 0.3–6.2)
EOSINOPHIL NFR BLD AUTO: 3 % (ref 0.3–6.2)
ERYTHROCYTE [DISTWIDTH] IN BLOOD BY AUTOMATED COUNT: 11.9 % (ref 12.3–15.4)
ERYTHROCYTE [DISTWIDTH] IN BLOOD BY AUTOMATED COUNT: 12.1 % (ref 12.3–15.4)
FLUAV SUBTYP SPEC NAA+PROBE: NOT DETECTED
FLUBV RNA ISLT QL NAA+PROBE: NOT DETECTED
GLUCOSE SERPL-MCNC: 101 MG/DL (ref 65–99)
HBA1C MFR BLD: 4.9 % (ref 4.8–5.6)
HCT VFR BLD AUTO: 35.5 % (ref 34–46.6)
HCT VFR BLD AUTO: 36.3 % (ref 34–46.6)
HDLC SERPL-MCNC: 68 MG/DL (ref 40–60)
HGB BLD-MCNC: 12.3 G/DL (ref 12–15.9)
HGB BLD-MCNC: 12.6 G/DL (ref 12–15.9)
HOLD SPECIMEN: NORMAL
HOLD SPECIMEN: NORMAL
IMM GRANULOCYTES # BLD AUTO: 0.01 10*3/MM3 (ref 0–0.05)
IMM GRANULOCYTES # BLD AUTO: 0.02 10*3/MM3 (ref 0–0.05)
IMM GRANULOCYTES NFR BLD AUTO: 0.2 % (ref 0–0.5)
IMM GRANULOCYTES NFR BLD AUTO: 0.3 % (ref 0–0.5)
INR PPP: 1.02 (ref 0.84–1.13)
INR PPP: 1.42 (ref 0.84–1.13)
LDLC SERPL CALC-MCNC: 70 MG/DL (ref 0–100)
LDLC/HDLC SERPL: 1.04 {RATIO}
LEFT ATRIUM VOLUME INDEX: 34.3 ML/M2
LV EF 2D ECHO EST: 60 %
LYMPHOCYTES # BLD AUTO: 1.86 10*3/MM3 (ref 0.7–3.1)
LYMPHOCYTES # BLD AUTO: 2.21 10*3/MM3 (ref 0.7–3.1)
LYMPHOCYTES NFR BLD AUTO: 33.4 % (ref 19.6–45.3)
LYMPHOCYTES NFR BLD AUTO: 34.6 % (ref 19.6–45.3)
MAGNESIUM SERPL-MCNC: 1.6 MG/DL (ref 1.6–2.4)
MAXIMAL PREDICTED HEART RATE: 143 BPM
MAXIMAL PREDICTED HEART RATE: 143 BPM
MCH RBC QN AUTO: 33 PG (ref 26.6–33)
MCH RBC QN AUTO: 33.4 PG (ref 26.6–33)
MCHC RBC AUTO-ENTMCNC: 34.6 G/DL (ref 31.5–35.7)
MCHC RBC AUTO-ENTMCNC: 34.7 G/DL (ref 31.5–35.7)
MCV RBC AUTO: 95.2 FL (ref 79–97)
MCV RBC AUTO: 96.3 FL (ref 79–97)
MONOCYTES # BLD AUTO: 0.44 10*3/MM3 (ref 0.1–0.9)
MONOCYTES # BLD AUTO: 0.45 10*3/MM3 (ref 0.1–0.9)
MONOCYTES NFR BLD AUTO: 6.8 % (ref 5–12)
MONOCYTES NFR BLD AUTO: 8.2 % (ref 5–12)
NEUTROPHILS NFR BLD AUTO: 2.86 10*3/MM3 (ref 1.7–7)
NEUTROPHILS NFR BLD AUTO: 3.72 10*3/MM3 (ref 1.7–7)
NEUTROPHILS NFR BLD AUTO: 53.1 % (ref 42.7–76)
NEUTROPHILS NFR BLD AUTO: 56.3 % (ref 42.7–76)
NRBC BLD AUTO-RTO: 0 /100 WBC (ref 0–0.2)
NRBC BLD AUTO-RTO: 0 /100 WBC (ref 0–0.2)
PERCENT MAX PREDICTED HR: 67.83 %
PLATELET # BLD AUTO: 219 10*3/MM3 (ref 140–450)
PLATELET # BLD AUTO: 222 10*3/MM3 (ref 140–450)
PMV BLD AUTO: 10.6 FL (ref 6–12)
PMV BLD AUTO: 9.9 FL (ref 6–12)
POTASSIUM SERPL-SCNC: 3.6 MMOL/L (ref 3.5–5.2)
PROTHROMBIN TIME: 13.3 SECONDS (ref 11.4–14.4)
PROTHROMBIN TIME: 17.3 SECONDS (ref 11.4–14.4)
QT INTERVAL: 394 MS
QT INTERVAL: 438 MS
QTC INTERVAL: 448 MS
QTC INTERVAL: 462 MS
RBC # BLD AUTO: 3.73 10*6/MM3 (ref 3.77–5.28)
RBC # BLD AUTO: 3.77 10*6/MM3 (ref 3.77–5.28)
SARS-COV-2 RNA PNL SPEC NAA+PROBE: NOT DETECTED
SODIUM SERPL-SCNC: 146 MMOL/L (ref 136–145)
STRESS BASELINE BP: NORMAL MMHG
STRESS BASELINE HR: 67 BPM
STRESS O2 SAT REST: 97 %
STRESS PERCENT HR: 80 %
STRESS POST ESTIMATED WORKLOAD: 1 METS
STRESS POST EXERCISE DUR MIN: 4 MIN
STRESS POST EXERCISE DUR SEC: 0 SEC
STRESS POST O2 SAT PEAK: 98 %
STRESS POST PEAK BP: NORMAL MMHG
STRESS POST PEAK HR: 97 BPM
STRESS TARGET HR: 122 BPM
STRESS TARGET HR: 122 BPM
TRIGL SERPL-MCNC: 46 MG/DL (ref 0–150)
TROPONIN T SERPL-MCNC: 0.01 NG/ML (ref 0–0.03)
TROPONIN T SERPL-MCNC: <0.01 NG/ML (ref 0–0.03)
TSH SERPL DL<=0.05 MIU/L-ACNC: 1.67 UIU/ML (ref 0.27–4.2)
UFH PPP CHRO-ACNC: 0.1 IU/ML (ref 0.3–0.7)
UFH PPP CHRO-ACNC: 0.14 IU/ML (ref 0.3–0.7)
VLDLC SERPL-MCNC: 10 MG/DL (ref 5–40)
WBC NRBC COR # BLD: 5.38 10*3/MM3 (ref 3.4–10.8)
WBC NRBC COR # BLD: 6.61 10*3/MM3 (ref 3.4–10.8)
WHOLE BLOOD HOLD COAG: NORMAL
WHOLE BLOOD HOLD SPECIMEN: NORMAL

## 2022-08-09 PROCEDURE — 85025 COMPLETE CBC W/AUTO DIFF WBC: CPT | Performed by: STUDENT IN AN ORGANIZED HEALTH CARE EDUCATION/TRAINING PROGRAM

## 2022-08-09 PROCEDURE — 93306 TTE W/DOPPLER COMPLETE: CPT

## 2022-08-09 PROCEDURE — 93010 ELECTROCARDIOGRAM REPORT: CPT | Performed by: INTERNAL MEDICINE

## 2022-08-09 PROCEDURE — 84484 ASSAY OF TROPONIN QUANT: CPT | Performed by: STUDENT IN AN ORGANIZED HEALTH CARE EDUCATION/TRAINING PROGRAM

## 2022-08-09 PROCEDURE — 80048 BASIC METABOLIC PNL TOTAL CA: CPT | Performed by: NURSE PRACTITIONER

## 2022-08-09 PROCEDURE — 85520 HEPARIN ASSAY: CPT | Performed by: STUDENT IN AN ORGANIZED HEALTH CARE EDUCATION/TRAINING PROGRAM

## 2022-08-09 PROCEDURE — 85610 PROTHROMBIN TIME: CPT | Performed by: STUDENT IN AN ORGANIZED HEALTH CARE EDUCATION/TRAINING PROGRAM

## 2022-08-09 PROCEDURE — 78431 MYOCRD IMG PET RST&STRS CT: CPT | Performed by: INTERNAL MEDICINE

## 2022-08-09 PROCEDURE — 99214 OFFICE O/P EST MOD 30 MIN: CPT | Performed by: INTERNAL MEDICINE

## 2022-08-09 PROCEDURE — 83735 ASSAY OF MAGNESIUM: CPT | Performed by: NURSE PRACTITIONER

## 2022-08-09 PROCEDURE — 99236 HOSP IP/OBS SAME DATE HI 85: CPT | Performed by: FAMILY MEDICINE

## 2022-08-09 PROCEDURE — 96376 TX/PRO/DX INJ SAME DRUG ADON: CPT

## 2022-08-09 PROCEDURE — 25010000002 HEPARIN (PORCINE) 25000-0.45 UT/250ML-% SOLUTION: Performed by: STUDENT IN AN ORGANIZED HEALTH CARE EDUCATION/TRAINING PROGRAM

## 2022-08-09 PROCEDURE — 25010000002 HEPARIN (PORCINE) PER 1000 UNITS

## 2022-08-09 PROCEDURE — 93005 ELECTROCARDIOGRAM TRACING: CPT | Performed by: STUDENT IN AN ORGANIZED HEALTH CARE EDUCATION/TRAINING PROGRAM

## 2022-08-09 PROCEDURE — 85025 COMPLETE CBC W/AUTO DIFF WBC: CPT | Performed by: NURSE PRACTITIONER

## 2022-08-09 PROCEDURE — 93017 CV STRESS TEST TRACING ONLY: CPT

## 2022-08-09 PROCEDURE — 25010000002 HEPARIN (PORCINE) PER 1000 UNITS: Performed by: STUDENT IN AN ORGANIZED HEALTH CARE EDUCATION/TRAINING PROGRAM

## 2022-08-09 PROCEDURE — G0378 HOSPITAL OBSERVATION PER HR: HCPCS

## 2022-08-09 PROCEDURE — 36415 COLL VENOUS BLD VENIPUNCTURE: CPT

## 2022-08-09 PROCEDURE — 78431 MYOCRD IMG PET RST&STRS CT: CPT

## 2022-08-09 PROCEDURE — 83036 HEMOGLOBIN GLYCOSYLATED A1C: CPT | Performed by: NURSE PRACTITIONER

## 2022-08-09 PROCEDURE — 85610 PROTHROMBIN TIME: CPT | Performed by: NURSE PRACTITIONER

## 2022-08-09 PROCEDURE — 25010000002 REGADENOSON 0.4 MG/5ML SOLUTION

## 2022-08-09 PROCEDURE — 96366 THER/PROPH/DIAG IV INF ADDON: CPT

## 2022-08-09 PROCEDURE — A9555 RB82 RUBIDIUM: HCPCS

## 2022-08-09 PROCEDURE — 85730 THROMBOPLASTIN TIME PARTIAL: CPT | Performed by: STUDENT IN AN ORGANIZED HEALTH CARE EDUCATION/TRAINING PROGRAM

## 2022-08-09 PROCEDURE — 63710000001 AZATHIOPRINE PER 50 MG: Performed by: NURSE PRACTITIONER

## 2022-08-09 PROCEDURE — 84443 ASSAY THYROID STIM HORMONE: CPT | Performed by: NURSE PRACTITIONER

## 2022-08-09 PROCEDURE — 25010000002 HEPARIN (PORCINE) 25000-0.45 UT/250ML-% SOLUTION: Performed by: NURSE PRACTITIONER

## 2022-08-09 PROCEDURE — 80061 LIPID PANEL: CPT | Performed by: NURSE PRACTITIONER

## 2022-08-09 PROCEDURE — 93018 CV STRESS TEST I&R ONLY: CPT | Performed by: INTERNAL MEDICINE

## 2022-08-09 PROCEDURE — 96361 HYDRATE IV INFUSION ADD-ON: CPT

## 2022-08-09 PROCEDURE — 93306 TTE W/DOPPLER COMPLETE: CPT | Performed by: INTERNAL MEDICINE

## 2022-08-09 PROCEDURE — 87636 SARSCOV2 & INF A&B AMP PRB: CPT | Performed by: FAMILY MEDICINE

## 2022-08-09 PROCEDURE — 84484 ASSAY OF TROPONIN QUANT: CPT | Performed by: NURSE PRACTITIONER

## 2022-08-09 PROCEDURE — 96365 THER/PROPH/DIAG IV INF INIT: CPT

## 2022-08-09 PROCEDURE — 85520 HEPARIN ASSAY: CPT

## 2022-08-09 PROCEDURE — 0 RUBIDIUM CHLORIDE

## 2022-08-09 PROCEDURE — 93005 ELECTROCARDIOGRAM TRACING: CPT | Performed by: NURSE PRACTITIONER

## 2022-08-09 RX ORDER — HEPARIN SODIUM 10000 [USP'U]/100ML
14 INJECTION, SOLUTION INTRAVENOUS
Status: DISCONTINUED | OUTPATIENT
Start: 2022-08-09 | End: 2022-08-09 | Stop reason: HOSPADM

## 2022-08-09 RX ORDER — ACETAMINOPHEN 325 MG/1
650 TABLET ORAL EVERY 4 HOURS PRN
Status: DISCONTINUED | OUTPATIENT
Start: 2022-08-09 | End: 2022-08-09 | Stop reason: HOSPADM

## 2022-08-09 RX ORDER — HYDROXYCHLOROQUINE SULFATE 200 MG/1
200 TABLET, FILM COATED ORAL DAILY
Status: DISCONTINUED | OUTPATIENT
Start: 2022-08-09 | End: 2022-08-09 | Stop reason: HOSPADM

## 2022-08-09 RX ORDER — ATORVASTATIN CALCIUM 20 MG/1
20 TABLET, FILM COATED ORAL NIGHTLY
Status: DISCONTINUED | OUTPATIENT
Start: 2022-08-09 | End: 2022-08-09 | Stop reason: HOSPADM

## 2022-08-09 RX ORDER — HEPARIN SODIUM 1000 [USP'U]/ML
25 INJECTION, SOLUTION INTRAVENOUS; SUBCUTANEOUS AS NEEDED
Status: DISCONTINUED | OUTPATIENT
Start: 2022-08-09 | End: 2022-08-09 | Stop reason: DRUGHIGH

## 2022-08-09 RX ORDER — GUAIFENESIN 600 MG/1
600 TABLET, EXTENDED RELEASE ORAL EVERY 12 HOURS SCHEDULED
Status: DISCONTINUED | OUTPATIENT
Start: 2022-08-09 | End: 2022-08-09 | Stop reason: HOSPADM

## 2022-08-09 RX ORDER — SODIUM CHLORIDE 450 MG/100ML
100 INJECTION, SOLUTION INTRAVENOUS CONTINUOUS
Status: DISCONTINUED | OUTPATIENT
Start: 2022-08-09 | End: 2022-08-09 | Stop reason: HOSPADM

## 2022-08-09 RX ORDER — SODIUM CHLORIDE 0.9 % (FLUSH) 0.9 %
10 SYRINGE (ML) INJECTION AS NEEDED
Status: DISCONTINUED | OUTPATIENT
Start: 2022-08-09 | End: 2022-08-09 | Stop reason: HOSPADM

## 2022-08-09 RX ORDER — OXYBUTYNIN CHLORIDE 5 MG/1
10 TABLET, EXTENDED RELEASE ORAL DAILY
Status: DISCONTINUED | OUTPATIENT
Start: 2022-08-09 | End: 2022-08-09 | Stop reason: HOSPADM

## 2022-08-09 RX ORDER — ASPIRIN 81 MG/1
81 TABLET ORAL DAILY
Status: DISCONTINUED | OUTPATIENT
Start: 2022-08-09 | End: 2022-08-09 | Stop reason: HOSPADM

## 2022-08-09 RX ORDER — HEPARIN SODIUM 1000 [USP'U]/ML
60 INJECTION, SOLUTION INTRAVENOUS; SUBCUTANEOUS ONCE
Status: COMPLETED | OUTPATIENT
Start: 2022-08-09 | End: 2022-08-09

## 2022-08-09 RX ORDER — CAFFEINE CITRATE 20 MG/ML
60 SOLUTION INTRAVENOUS ONCE
Status: COMPLETED | OUTPATIENT
Start: 2022-08-09 | End: 2022-08-09

## 2022-08-09 RX ORDER — HEPARIN SODIUM 1000 [USP'U]/ML
50 INJECTION, SOLUTION INTRAVENOUS; SUBCUTANEOUS AS NEEDED
Status: DISCONTINUED | OUTPATIENT
Start: 2022-08-09 | End: 2022-08-09 | Stop reason: DRUGHIGH

## 2022-08-09 RX ORDER — AZATHIOPRINE 50 MG/1
50 TABLET ORAL DAILY
Status: DISCONTINUED | OUTPATIENT
Start: 2022-08-09 | End: 2022-08-09 | Stop reason: HOSPADM

## 2022-08-09 RX ORDER — HEPARIN SODIUM 1000 [USP'U]/ML
1500 INJECTION, SOLUTION INTRAVENOUS; SUBCUTANEOUS ONCE
Status: COMPLETED | OUTPATIENT
Start: 2022-08-09 | End: 2022-08-09

## 2022-08-09 RX ORDER — LEVOCETIRIZINE DIHYDROCHLORIDE 5 MG/1
5 TABLET, FILM COATED ORAL EVERY EVENING
COMMUNITY

## 2022-08-09 RX ORDER — SODIUM CHLORIDE 0.9 % (FLUSH) 0.9 %
10 SYRINGE (ML) INJECTION EVERY 12 HOURS SCHEDULED
Status: DISCONTINUED | OUTPATIENT
Start: 2022-08-09 | End: 2022-08-09 | Stop reason: HOSPADM

## 2022-08-09 RX ADMIN — RUBIDIUM CHLORIDE RB-82 1 DOSE: 150 INJECTION, SOLUTION INTRAVENOUS at 12:53

## 2022-08-09 RX ADMIN — REGADENOSON 0.4 MG: 0.08 INJECTION, SOLUTION INTRAVENOUS at 12:51

## 2022-08-09 RX ADMIN — CAFFEINE CITRATE 60 MG: 20 INJECTION, SOLUTION INTRAVENOUS at 13:06

## 2022-08-09 RX ADMIN — SODIUM CHLORIDE 100 ML/HR: 4.5 INJECTION, SOLUTION INTRAVENOUS at 11:17

## 2022-08-09 RX ADMIN — ASPIRIN 81 MG: 81 TABLET, COATED ORAL at 13:43

## 2022-08-09 RX ADMIN — HEPARIN SODIUM 12 UNITS/KG/HR: 10000 INJECTION, SOLUTION INTRAVENOUS at 02:40

## 2022-08-09 RX ADMIN — GUAIFENESIN 600 MG: 600 TABLET, EXTENDED RELEASE ORAL at 13:44

## 2022-08-09 RX ADMIN — HEPARIN SODIUM 3400 UNITS: 1000 INJECTION, SOLUTION INTRAVENOUS; SUBCUTANEOUS at 02:39

## 2022-08-09 RX ADMIN — LISINOPRIL: 20 TABLET ORAL at 13:42

## 2022-08-09 RX ADMIN — RUBIDIUM CHLORIDE RB-82 1 DOSE: 150 INJECTION, SOLUTION INTRAVENOUS at 12:42

## 2022-08-09 RX ADMIN — Medication 10 ML: at 09:28

## 2022-08-09 RX ADMIN — HYDROXYCHLOROQUINE SULFATE 200 MG: 200 TABLET ORAL at 13:44

## 2022-08-09 RX ADMIN — SODIUM CHLORIDE 500 ML: 9 INJECTION, SOLUTION INTRAVENOUS at 01:07

## 2022-08-09 RX ADMIN — HEPARIN SODIUM 1500 UNITS: 1000 INJECTION, SOLUTION INTRAVENOUS; SUBCUTANEOUS at 11:49

## 2022-08-09 NOTE — CASE MANAGEMENT/SOCIAL WORK
Discharge Planning Assessment  [facility]     Patient Name: Mary Saucedo  MRN: [id number]  Today's Date: 8/9/2022    Admit Date: 8/8/2022     Discharge Needs Assessment     Row Name 08/09/22 1006       Living Environment    People in Home alone    Current Living Arrangements home       Transition Planning    Patient/Family Anticipates Transition to home    Patient/Family Anticipated Services at Transition none    Transportation Anticipated car, drives self;family or friend will provide       Discharge Needs Assessment    Equipment Currently Used at Home none    Equipment Needed After Discharge none               Discharge Plan     Row Name 08/09/22 1006       Plan    Plan Home at D/C.    Patient/Family in Agreement with Plan yes    Plan Comments Spoke with patient at bedside. Patient lives in [facility] alone. PCP is Brianna Alvarez. Has no DME's or is not current with . Daughter can transport home when D/C. No D/C needs at this time. Will continue to monitor.    Final Discharge Disposition Code 01 - home or self-care              Continued Care and Services - Admitted Since 8/8/2022    Coordination has not been started for this encounter.       Expected Discharge Date and Time     Expected Discharge Date Expected Discharge Time    Aug 10, 2022          Demographic Summary     Row Name 08/09/22 1005       General Information    Admission Type observation    Preferred Language English               Functional Status     Row Name 08/09/22 1006       Functional Status    Usual Activity Tolerance good    Current Activity Tolerance good       Functional Status, IADL    Medications independent    Meal Preparation independent    Housekeeping independent    Laundry independent    Shopping independent               Psychosocial    No documentation.                Abuse/Neglect    No documentation.                Legal    No documentation.                Substance Abuse    No documentation.                 Patient Forms    No documentation.                   Wanda Louise RN

## 2022-08-09 NOTE — CONSULTS
[facility] at [facility]   Consult Note    Referring Provider:   Brianna Marques MD    Identification: Current resident of [facility].  .  1 daughter.  Retired from  at [employer].  Currently part-time worker at [employer].    Cardiologist: Dr. Solano - [facility]    Reason for Consultation: Chest pain, palpitations, dizziness    Problem list:  1. Coronary artery disease  1. 10/18 treadmill stress test: No chest pain or discomfort.  No significant EKG changes, occasional PVCs and PACs.  Appropriate heart rate and blood pressure response.  THR achieved at 6:26.  Max heart rate 131, 89% of MPHR.  Arrhythmias were not significant.  Negative stress ekg for ischemia.  2. 1/20 exercise MPS: Normal graded exercise stress test plan clinical and ECG criteria.  Excellent exercise capacity.  Perfusion imaging indicates normal myocardial perfusion study with no evidence of ischemia.  LVEF = 70%.  Coronary calcification noted on CT attenuation images.  Low risk study.  2. Dizziness/palpitations  1. 10/18 30-day MCOT: Baseline rhythm NSR with AVG 73, min 46, max 115.  Otherwise normal Holter monitor.  3. Cardiac murmur   1. 10/18 2D echo: LV systolic function WNL (66 to 70%).  Grade 1 diastolic dysfunction.  LA cavity mildly dilated.  4. Carotid artery disease  1. 10/18 carotid US: Bilateral ICA stenosis of 0-49%.  Antegrade vertebrals bilaterally.  5. Hypertension  6. Hyperlipidemia  7. Former smoker-cessation 2015  8. Emphysema/COPD  9. GERD  10. Hiatal hernia   11. Sjogren's syndrome  12. Surgical   1. blepharoptosis repair  2. Breast lumpectomy  3. D&C hysteroscopy  4. Hemorrhoidectomy  5. [place] tooth extraction      Patient Care Team:  Brianna Marques MD as PCP - General (Internal Medicine)    Past Medical History:   Diagnosis Date   • Arthritis    • Back pain    • Bronchitis     pcp diagnosed recently    • Cancer (CMS/[facility])      precancerous removed from nose    • Carotid stenosis, bilateral     Nonobstructive   • Cataracts, bilateral    • Chronic sinusitis    • Emphysema lung (CMS/HCC)    • GERD (gastroesophageal reflux disease)     no longer issue    • Heart murmur     at 18-   no longer real issue    • Hiatal hernia    • Hx: UTI (urinary tract infection)     back in oct 2016- no s/s currently    • Hyperlipidemia    • Hypertensive disorder    • Migraine     h/o    • PMB (postmenopausal bleeding)    • Sjoegren syndrome    • Uterine fibroid    • Wears glasses    • Wrist fracture, left        Past Surgical History:   Procedure Laterality Date   • BELPHAROPTOSIS REPAIR Bilateral 1998   • BREAST LUMPECTOMY Right 1989   • BREAST LUMPECTOMY Left 07/2009   • CARDIOVASCULAR STRESS TEST      x3-4    • COLONOSCOPY      x5   • D&C HYSTEROSCOPY N/A 3/1/2017    Procedure: DILATATION AND CURETTAGE HYSTEROSCOPY;  Surgeon: Addis Mccord MD;  Location: [facility];  Service:    • ENDOSCOPY      x2   • HEMORRHOIDECTOMY  1976   • SKIN BIOPSY     • SKIN CANCER EXCISION      precancerous removed from nose    • WISDOM TOOTH EXTRACTION           Allergies   Allergen Reactions   • Hydrocodone Nausea And Vomiting   • Codeine Rash           Current Facility-Administered Medications:   •  acetaminophen (TYLENOL) tablet 650 mg, 650 mg, Oral, Q4H PRN, Mariah Holly R, APRN  •  amLODIPine (NORVASC) 10 mg, lisinopril (PRINIVIL,ZESTRIL) 20 mg, , Oral, Q24H, Mariah Holly R, APRN  •  aspirin chewable tablet 324 mg, 324 mg, Oral, Once, Mariah Holly R, APRN  •  aspirin EC tablet 81 mg, 81 mg, Oral, Daily, Mariah Holly, APRN  •  atorvastatin (LIPITOR) tablet 20 mg, 20 mg, Oral, Nightly, Mariah Holly R, APRN  •  azaTHIOprine (IMURAN) tablet 50 mg, 50 mg, Oral, Daily, Mariah Holly, APRN  •  guaiFENesin (MUCINEX) 12 hr tablet 600 mg, 600 mg, Oral, Q12H, Ida Gregory, DO  •  heparin 14435 units/250 mL (100 units/mL) in 0.45 % NaCl infusion, 12 Units/kg/hr,  Intravenous, Titrated, Mariah Holly APRN, Last Rate: 6.8 mL/hr at 08/09/22 0802, 12 Units/kg/hr at 08/09/22 0802  •  hydroxychloroquine (PLAQUENIL) tablet 200 mg, 200 mg, Oral, Daily, Mariah Holly APRN  •  oxybutynin XL (DITROPAN-XL) 24 hr tablet 10 mg, 10 mg, Oral, Daily, Mariah Holly APRN  •  Pharmacy to Dose Heparin, , Does not apply, Continuous PRN, Mariah Holly APRN  •  sodium chloride 0.9 % flush 10 mL, 10 mL, Intravenous, PRN, Ko Garcia MD  •  sodium chloride 0.9 % flush 10 mL, 10 mL, Intravenous, Q12H, Mariah Holly APRN  •  sodium chloride 0.9 % flush 10 mL, 10 mL, Intravenous, PRN, Mariah Holly APRN    heparin, 12 Units/kg/hr, Last Rate: 12 Units/kg/hr (08/09/22 0802)  Pharmacy to Dose Heparin,         Medications Prior to Admission   Medication Sig Dispense Refill Last Dose   • acetaminophen (TYLENOL) 325 MG tablet Take 2 tablets by mouth Every 4 (Four) Hours As Needed for mild pain (1-3). 1 bottle 0 8/8/2022 at Unknown time   • amLODIPine-benazepril (LOTREL) 10-20 MG per capsule TK 1 C PO D  11 8/9/2022 at Unknown time   • aspirin 81 MG EC tablet Take 81 mg by mouth Daily. Stopped 2/21/17 per dr barr   8/9/2022 at Unknown time   • atorvastatin (LIPITOR) 20 MG tablet Take 1 tablet by mouth Daily. 90 tablet 3 8/9/2022 at Unknown time   • Cholecalciferol (VITAMIN D3) 2000 UNITS tablet Take 1 capsule by mouth Daily.   8/9/2022 at Unknown time   • hydroxychloroquine (PLAQUENIL) 200 MG tablet Take 200 mg by mouth Daily. sjorens  3 8/9/2022 at Unknown time   • vitamin B-12 (CYANOCOBALAMIN) 1000 MCG tablet Place 3,000 mcg under the tongue Daily.   8/9/2022 at Unknown time   • azaTHIOprine (IMURAN) 50 MG tablet Take 1 tablet by mouth Daily.   Unknown at Unknown time   • fluticasone (FLONASE) 50 MCG/ACT nasal spray 2 sprays into the nostril(s) as directed by provider Daily As Needed. havent taken in about 5 days   Unknown at Unknown time   • hydrochlorothiazide  "(HYDRODIURIL) 25 MG tablet TK 1 T PO D FOR BLOOD PRESSURE.  3 Unknown at Unknown time   • ibuprofen (ADVIL,MOTRIN) 600 MG tablet Take 1 tablet by mouth Every 6 (Six) Hours As Needed for mild pain (1-3). 60 tablet 0 Unknown at Unknown time   • naproxen sodium (ALEVE) 220 MG tablet Take 220 mg by mouth 2 (Two) Times a Day As Needed.   More than a month at Unknown time   • oxybutynin XL (DITROPAN-XL) 10 MG 24 hr tablet 10 mg Daily.   Unknown at Unknown time         Subjective .   History of present illness:    Mary Blair is a 77-year-old  female with above past medical history presents to [facility] on 8/8/2022 complaining of a nonexertional episode of chest pain, palpitations, and dizziness.    History today reveals that the patient is been experiencing interval worsening of her previously evaluated palpitations, lightheadedness, and dizziness.  Per patient report, the patient states that her cardiac care is with Dr. Solano of [facility]. She has had a prior Holter monitor that was performed for these aforementioned palpitations in July of this year.  The patient reports that the monitor that was administered revealed \"blips\" of an arrhythmia that Dr. Solano could not interpret/did not believe was secondary or indicative of atrial fibrillation.    However despite this history, the patient reports that she has had a significant interval change of her reported palpitations.  The patient reports that these palpitations are generally nonexertional.  She states that they typically occur infrequently but over the last week have been occurring daily.  She states that these palpitations occur generally as she is getting dressed in the early a.m.  She states that these palpitations are associated with some mild/dull retrosternal chest pain and the palpitations radiate into her neck.  She also reports that these palpitations are associated with mild dizziness, and presyncope at times.    She " presented to [facility] on 2022 due to the fact that this episode was far more prolonged than her prior episodes.  Patient stated that her historical palpitations began at 10 PM in the evening of  and due to the episodes persistence, she called EMS for transportation to the hospital.  Of significance, upon EMS evaluation the patient reported that the EMS techs did an EKG/telemetry and she stated that they diagnosed her with atrial fibrillation.  (Unfortunately no records of arrhythmia are present to review).    She stated that her palpitations persisted until she was given p.o. nitroglycerin and aspirin by EMS.    Regarding modifying factors, the patient reports that a shower at times elicits her palpitations and makes her weak, exertion intermittently causes these palpitations.  However she denies any p.o. medications nor other eliciting/exacerbating factors such as food/activities elicit her aforementioned palpitations/reported symptomology.    Social History     Socioeconomic History   • Marital status:    • Number of children: 1   Tobacco Use   • Smoking status: Former Smoker     Packs/day: 0.25     Years: 10.00     Pack years: 2.50     Types: Cigarettes     Quit date:      Years since quittin.6   • Smokeless tobacco: Never Used   Substance and Sexual Activity   • Alcohol use: Yes     Comment: occasional    • Drug use: No   • Sexual activity: Never     Family History   Problem Relation Age of Onset   • Alzheimer's disease Mother 80   • Colon cancer Father 60   • Heart attack Father 40   • Emphysema Father    • Arthritis Sister    • Stomach cancer Paternal Grandfather    • Lactose intolerance Daughter         milk allergy         Review of Systems:  Review of Systems   Cardiovascular: Positive for chest pain, irregular heartbeat and palpitations. Negative for dyspnea on exertion and syncope.   Respiratory: Negative for shortness of breath.    Neurological: Positive for  "dizziness.        Objective   Vitals:  /69 (BP Location: Left arm, Patient Position: Lying)   Pulse 63   Temp 96.1 °F (35.6 °C) (Oral)   Resp 18   Ht 157.5 cm (62\")   Wt 58.3 kg (128 lb 8 oz)   SpO2 99%   BMI 23.50 kg/m²      Intake/Output Summary (Last 24 hours) at 8/9/2022 0840  Last data filed at 8/9/2022 0234  Gross per 24 hour   Intake 500 ml   Output --   Net 500 ml       Vitals and nursing note reviewed.   Constitutional:       General: Awake. Not in acute distress.     Appearance: Healthy appearance. Well-developed and not in distress.   Eyes:      General: No scleral icterus.     Conjunctiva/sclera: Conjunctivae normal.      Pupils: Pupils are equal, round, and reactive to light.   HENT:      Head: Normocephalic and atraumatic.      Nose: Nose normal.    Mouth/Throat:      Pharynx: Uvula midline.   Neck:      Vascular: No carotid bruit.      Trachea: No tracheal deviation.   Pulmonary:      Effort: Pulmonary effort is normal.      Breath sounds: Normal breath sounds. No wheezing. No rhonchi. No rales.   Cardiovascular:      Normal rate. Regular rhythm. Normal S1. Normal S2.      Murmurs: There is no murmur.      No gallop. No click. No rub.   Pulses:     Intact distal pulses.   Edema:     Peripheral edema absent.   Abdominal:      General: Bowel sounds are normal.      Palpations: Abdomen is soft.   Musculoskeletal:      Cervical back: Normal range of motion and neck supple. Skin:     General: Skin is warm and dry.      Findings: No erythema.   Neurological:      Mental Status: Alert, oriented to person, place, and time and oriented to person, place and time.   Psychiatric:         Attention and Perception: Attention normal.         Mood and Affect: Mood normal.         Speech: Speech normal.         Behavior: Behavior normal. Behavior is cooperative.          Results Review:  I reviewed the patient's new clinical results.  Results from last 7 days   Lab Units 08/09/22  0524   WBC 10*3/mm3 5.38 "   HEMOGLOBIN g/dL 12.3   HEMATOCRIT % 35.5   PLATELETS 10*3/mm3 219     Results from last 7 days   Lab Units 08/09/22  0524 08/08/22  2303   SODIUM mmol/L 146* 146*   POTASSIUM mmol/L 3.6 4.2   CHLORIDE mmol/L 113* 111*   CO2 mmol/L 23.0 24.0   BUN mg/dL 13 19   CREATININE mg/dL 0.58 0.66   CALCIUM mg/dL 9.6 10.3   BILIRUBIN mg/dL  --  0.4   ALK PHOS U/L  --  67   ALT (SGPT) U/L  --  20   AST (SGOT) U/L  --  22   GLUCOSE mg/dL 101* 105*     Results from last 7 days   Lab Units 08/09/22  0524   SODIUM mmol/L 146*   POTASSIUM mmol/L 3.6   CHLORIDE mmol/L 113*   CO2 mmol/L 23.0   BUN mg/dL 13   CREATININE mg/dL 0.58   GLUCOSE mg/dL 101*   CALCIUM mg/dL 9.6     Results from last 7 days   Lab Units 08/09/22  0524 08/09/22  0240   INR  1.02 1.42*     Lab Results   Lab Value Date/Time    TROPONINT <0.010 08/09/2022 0524    TROPONINT 0.015 08/09/2022 0059    TROPONINT <0.010 08/08/2022 2303    TROPONINT <0.010 01/03/2020 1841     Results from last 7 days   Lab Units 08/09/22  0524   TSH uIU/mL 1.670     Results from last 7 days   Lab Units 08/09/22  0524   CHOLESTEROL mg/dL 148   TRIGLYCERIDES mg/dL 46   HDL CHOL mg/dL 68*   LDL CHOL mg/dL 70     Results from last 7 days   Lab Units 08/08/22  2303   PROBNP pg/mL 160.5       XR Chest 1 View    Result Date: 8/8/2022  No acute cardiopulmonary abnormality. Electronically signed by:  Chico Damon M.D.  8/8/2022 9:49 PM Mountain Time      Tele:  NSR    EKG: Normal sinus rhythm  Normal ECG  Non-specific St-T wave changes      Assessment & Plan     1. Atypical chest pain in context of CAD  1. ACS ruled out with acceptable EKGs and troponins.  EKGs indicative of sinus arrhythmia/NSR.  2. We will pursue ischemic evaluation at this time due to the patient's reported dull atypical chest pain with breakthrough palpitations.  3. The patient denies any persistent nor pervasive/reproducible exertional chest pain, pressure, tightness, nor squeezing.  2. Palipitations/Dizziness  1. Due to  "the patient's reported palpitations and EMS reported \"atrial fibrillation\" we would suggest longer term monitoring  as outpt either with pt \"wearable devices or holter monitoring.  2. We will review echocardiogram to assess for heart structure and function.    3. HTN  1. Patient's blood pressures this a.m. slightly above goal of <130/80.  2. We will initiate Beta blockade after stress testing today.  4. HLD  1. Patient to continue atorvastatin 20 mg daily.      Garcia Young PA-C for Dr. Todd Martins M.D. [facility]  "

## 2022-08-09 NOTE — ED PROVIDER NOTES
EMERGENCY DEPARTMENT ENCOUNTER    Pt Name: Mary Saucedo  MRN: [id number]  Pt :   1945  Room Number:  S580/1  Date of encounter:  2022  PCP: Brianna Marques MD  ED Provider: Ko Garcia MD    Historian: Patient      HPI:  Chief Complaint: Chest pain        Context: Mary Saucedo is a 77-year-old woman with history of hypertension, hyperlipidemia, known coronary artery disease who presents for acute onset of 5 out of 10, dull substernal chest pain radiating to her neck and mid back.  She says the pain started this evening while at rest and she has not particularly appreciated it to be exertional but says it did get better with nitroglycerin that EMS gave her.  She was recently evaluated for similar chest pain on Thursday at [facility] where CTA revealed coronary calcifications but no other acute abnormalities.  She says her pain is similar to that and improved dramatically with the aspirin and nitroglycerin that EMS gave her.  She denies any fevers or systemic symptoms.  Denies leg swelling or immobilization.  Currently rates pain as moderate, dull, fluttering pain.  No other complaints at this time.      PAST MEDICAL HISTORY  Past Medical History:   Diagnosis Date   • Arthritis    • Back pain    • Bronchitis     pcp diagnosed recently    • Cancer (CMS/HCC)     precancerous removed from nose    • Carotid stenosis, bilateral     Nonobstructive   • Cataracts, bilateral    • Chronic sinusitis    • Emphysema lung (CMS/HCC)    • GERD (gastroesophageal reflux disease)     no longer issue    • Heart murmur     at 18-   no longer real issue    • Hiatal hernia    • Hx: UTI (urinary tract infection)     back in oct 2016- no s/s currently    • Hyperlipidemia    • Hypertensive disorder    • Migraine     h/o    • PMB (postmenopausal bleeding)    • Sjoegren syndrome    • Uterine fibroid    • Wears glasses    • Wrist fracture, left          PAST SURGICAL HISTORY  Past Surgical  History:   Procedure Laterality Date   • BELPHAROPTOSIS REPAIR Bilateral    • BREAST LUMPECTOMY Right    • BREAST LUMPECTOMY Left 2009   • CARDIOVASCULAR STRESS TEST      x3-4    • COLONOSCOPY      x5   • D&C HYSTEROSCOPY N/A 3/1/2017    Procedure: DILATATION AND CURETTAGE HYSTEROSCOPY;  Surgeon: Addis Mccord MD;  Location: [facility];  Service:    • ENDOSCOPY      x2   • HEMORRHOIDECTOMY     • SKIN BIOPSY     • SKIN CANCER EXCISION      precancerous removed from nose    • WISDOM TOOTH EXTRACTION           FAMILY HISTORY  Family History   Problem Relation Age of Onset   • Alzheimer's disease Mother 80   • Colon cancer Father 60   • Heart attack Father 40   • Emphysema Father    • Arthritis Sister    • Stomach cancer Paternal Grandfather    • Lactose intolerance Daughter         milk allergy         SOCIAL HISTORY  Social History     Socioeconomic History   • Marital status:    • Number of children: 1   Tobacco Use   • Smoking status: Former Smoker     Packs/day: 0.25     Years: 10.00     Pack years: 2.50     Types: Cigarettes     Quit date:      Years since quittin.6   • Smokeless tobacco: Never Used   Substance and Sexual Activity   • Alcohol use: Yes     Comment: occasional    • Drug use: No   • Sexual activity: Never         ALLERGIES  Hydrocodone and Codeine        REVIEW OF SYSTEMS  Review of Systems       All systems reviewed and negative except for those discussed in HPI.       PHYSICAL EXAM    I have reviewed the triage vital signs and nursing notes.    ED Triage Vitals   Temp Heart Rate Resp BP SpO2   226 22 2253 22 2253 22 2253   97.8 °F (36.6 °C) 88 20 146/76 99 %      Temp src Heart Rate Source Patient Position BP Location FiO2 (%)   226 22 2253 22 2253 --   Oral Monitor Lying Right arm        Physical Exam  GENERAL:   Appears uncomfortable but in no acute distress  HENT: Nares patent.  EYES:  No scleral icterus.  CV: Regular rhythm, regular rate.  RESPIRATORY: Normal effort.  No audible wheezes, rales or rhonchi.  ABDOMEN: Soft, nontender  MUSCULOSKELETAL: No deformities.   NEURO: Alert, moves all extremities, follows commands.  SKIN: Warm, dry, no rash visualized.        LAB RESULTS  Recent Results (from the past 24 hour(s))   ECG 12 Lead    Collection Time: 08/08/22 10:58 PM   Result Value Ref Range    QT Interval 394 ms    QTC Interval 462 ms   Troponin    Collection Time: 08/08/22 11:03 PM    Specimen: Blood   Result Value Ref Range    Troponin T <0.010 0.000 - 0.030 ng/mL   Comprehensive Metabolic Panel    Collection Time: 08/08/22 11:03 PM    Specimen: Blood   Result Value Ref Range    Glucose 105 (H) 65 - 99 mg/dL    BUN 19 8 - 23 mg/dL    Creatinine 0.66 0.57 - 1.00 mg/dL    Sodium 146 (H) 136 - 145 mmol/L    Potassium 4.2 3.5 - 5.2 mmol/L    Chloride 111 (H) 98 - 107 mmol/L    CO2 24.0 22.0 - 29.0 mmol/L    Calcium 10.3 8.6 - 10.5 mg/dL    Total Protein 6.8 6.0 - 8.5 g/dL    Albumin 4.40 3.50 - 5.20 g/dL    ALT (SGPT) 20 1 - 33 U/L    AST (SGOT) 22 1 - 32 U/L    Alkaline Phosphatase 67 39 - 117 U/L    Total Bilirubin 0.4 0.0 - 1.2 mg/dL    Globulin 2.4 gm/dL    A/G Ratio 1.8 g/dL    BUN/Creatinine Ratio 28.8 (H) 7.0 - 25.0    Anion Gap 11.0 5.0 - 15.0 mmol/L    eGFR 90.5 >60.0 mL/min/1.73   Lipase    Collection Time: 08/08/22 11:03 PM    Specimen: Blood   Result Value Ref Range    Lipase 45 13 - 60 U/L   BNP    Collection Time: 08/08/22 11:03 PM    Specimen: Blood   Result Value Ref Range    proBNP 160.5 0.0 - 1,800.0 pg/mL   Green Top (Gel)    Collection Time: 08/08/22 11:03 PM   Result Value Ref Range    Extra Tube Hold for add-ons.    Lavender Top    Collection Time: 08/08/22 11:03 PM   Result Value Ref Range    Extra Tube hold for add-on    Gold Top - SST    Collection Time: 08/08/22 11:03 PM   Result Value Ref Range    Extra Tube Hold for add-ons.    Light Blue Top    Collection Time:  08/08/22 11:03 PM   Result Value Ref Range    Extra Tube Hold for add-ons.    CBC Auto Differential    Collection Time: 08/08/22 11:03 PM    Specimen: Blood   Result Value Ref Range    WBC 5.22 3.40 - 10.80 10*3/mm3    RBC 3.94 3.77 - 5.28 10*6/mm3    Hemoglobin 13.1 12.0 - 15.9 g/dL    Hematocrit 36.9 34.0 - 46.6 %    MCV 93.7 79.0 - 97.0 fL    MCH 33.2 (H) 26.6 - 33.0 pg    MCHC 35.5 31.5 - 35.7 g/dL    RDW 12.3 12.3 - 15.4 %    RDW-SD 42.6 37.0 - 54.0 fl    MPV 9.6 6.0 - 12.0 fL    Platelets 227 140 - 450 10*3/mm3    Neutrophil % 46.8 42.7 - 76.0 %    Lymphocyte % 39.8 19.6 - 45.3 %    Monocyte % 8.8 5.0 - 12.0 %    Eosinophil % 3.6 0.3 - 6.2 %    Basophil % 0.8 0.0 - 1.5 %    Immature Grans % 0.2 0.0 - 0.5 %    Neutrophils, Absolute 2.44 1.70 - 7.00 10*3/mm3    Lymphocytes, Absolute 2.08 0.70 - 3.10 10*3/mm3    Monocytes, Absolute 0.46 0.10 - 0.90 10*3/mm3    Eosinophils, Absolute 0.19 0.00 - 0.40 10*3/mm3    Basophils, Absolute 0.04 0.00 - 0.20 10*3/mm3    Immature Grans, Absolute 0.01 0.00 - 0.05 10*3/mm3    nRBC 0.0 0.0 - 0.2 /100 WBC   D-dimer, Quantitative    Collection Time: 08/08/22 11:03 PM    Specimen: Blood   Result Value Ref Range    D-Dimer, Quantitative 0.33 0.01 - 0.50 MCGFEU/mL   Troponin    Collection Time: 08/09/22 12:59 AM    Specimen: Blood   Result Value Ref Range    Troponin T 0.015 0.000 - 0.030 ng/mL   ECG 12 Lead    Collection Time: 08/09/22 12:59 AM   Result Value Ref Range    QT Interval 438 ms    QTC Interval 448 ms   Heparin Anti-Xa    Collection Time: 08/09/22  2:40 AM    Specimen: Blood   Result Value Ref Range    Heparin Anti-Xa (UFH) 0.10 (L) 0.30 - 0.70 IU/ml   Protime-INR    Collection Time: 08/09/22  2:40 AM    Specimen: Blood   Result Value Ref Range    Protime 17.3 (H) 11.4 - 14.4 Seconds    INR 1.42 (H) 0.84 - 1.13   aPTT    Collection Time: 08/09/22  2:40 AM    Specimen: Blood   Result Value Ref Range    PTT 32.4 (L) 60.0 - 90.0 seconds   CBC Auto Differential    Collection  Time: 08/09/22  2:40 AM    Specimen: Blood   Result Value Ref Range    WBC 6.61 3.40 - 10.80 10*3/mm3    RBC 3.77 3.77 - 5.28 10*6/mm3    Hemoglobin 12.6 12.0 - 15.9 g/dL    Hematocrit 36.3 34.0 - 46.6 %    MCV 96.3 79.0 - 97.0 fL    MCH 33.4 (H) 26.6 - 33.0 pg    MCHC 34.7 31.5 - 35.7 g/dL    RDW 12.1 (L) 12.3 - 15.4 %    RDW-SD 43.2 37.0 - 54.0 fl    MPV 9.9 6.0 - 12.0 fL    Platelets 222 140 - 450 10*3/mm3    Neutrophil % 56.3 42.7 - 76.0 %    Lymphocyte % 33.4 19.6 - 45.3 %    Monocyte % 6.8 5.0 - 12.0 %    Eosinophil % 2.4 0.3 - 6.2 %    Basophil % 0.8 0.0 - 1.5 %    Immature Grans % 0.3 0.0 - 0.5 %    Neutrophils, Absolute 3.72 1.70 - 7.00 10*3/mm3    Lymphocytes, Absolute 2.21 0.70 - 3.10 10*3/mm3    Monocytes, Absolute 0.45 0.10 - 0.90 10*3/mm3    Eosinophils, Absolute 0.16 0.00 - 0.40 10*3/mm3    Basophils, Absolute 0.05 0.00 - 0.20 10*3/mm3    Immature Grans, Absolute 0.02 0.00 - 0.05 10*3/mm3    nRBC 0.0 0.0 - 0.2 /100 WBC   ECG 12 Lead    Collection Time: 08/09/22  4:53 AM   Result Value Ref Range    QT Interval 434 ms    QTC Interval 436 ms   Troponin    Collection Time: 08/09/22  5:24 AM    Specimen: Blood   Result Value Ref Range    Troponin T <0.010 0.000 - 0.030 ng/mL   Basic Metabolic Panel    Collection Time: 08/09/22  5:24 AM    Specimen: Blood   Result Value Ref Range    Glucose 101 (H) 65 - 99 mg/dL    BUN 13 8 - 23 mg/dL    Creatinine 0.58 0.57 - 1.00 mg/dL    Sodium 146 (H) 136 - 145 mmol/L    Potassium 3.6 3.5 - 5.2 mmol/L    Chloride 113 (H) 98 - 107 mmol/L    CO2 23.0 22.0 - 29.0 mmol/L    Calcium 9.6 8.6 - 10.5 mg/dL    BUN/Creatinine Ratio 22.4 7.0 - 25.0    Anion Gap 10.0 5.0 - 15.0 mmol/L    eGFR 93.3 >60.0 mL/min/1.73   CBC Auto Differential    Collection Time: 08/09/22  5:24 AM    Specimen: Blood   Result Value Ref Range    WBC 5.38 3.40 - 10.80 10*3/mm3    RBC 3.73 (L) 3.77 - 5.28 10*6/mm3    Hemoglobin 12.3 12.0 - 15.9 g/dL    Hematocrit 35.5 34.0 - 46.6 %    MCV 95.2 79.0 - 97.0  fL    MCH 33.0 26.6 - 33.0 pg    MCHC 34.6 31.5 - 35.7 g/dL    RDW 11.9 (L) 12.3 - 15.4 %    RDW-SD 41.7 37.0 - 54.0 fl    MPV 10.6 6.0 - 12.0 fL    Platelets 219 140 - 450 10*3/mm3    Neutrophil % 53.1 42.7 - 76.0 %    Lymphocyte % 34.6 19.6 - 45.3 %    Monocyte % 8.2 5.0 - 12.0 %    Eosinophil % 3.0 0.3 - 6.2 %    Basophil % 0.9 0.0 - 1.5 %    Immature Grans % 0.2 0.0 - 0.5 %    Neutrophils, Absolute 2.86 1.70 - 7.00 10*3/mm3    Lymphocytes, Absolute 1.86 0.70 - 3.10 10*3/mm3    Monocytes, Absolute 0.44 0.10 - 0.90 10*3/mm3    Eosinophils, Absolute 0.16 0.00 - 0.40 10*3/mm3    Basophils, Absolute 0.05 0.00 - 0.20 10*3/mm3    Immature Grans, Absolute 0.01 0.00 - 0.05 10*3/mm3    nRBC 0.0 0.0 - 0.2 /100 WBC   Protime-INR    Collection Time: 08/09/22  5:24 AM    Specimen: Blood   Result Value Ref Range    Protime 13.3 11.4 - 14.4 Seconds    INR 1.02 0.84 - 1.13   Hemoglobin A1c    Collection Time: 08/09/22  5:24 AM    Specimen: Blood   Result Value Ref Range    Hemoglobin A1C 4.90 4.80 - 5.60 %   Lipid Panel    Collection Time: 08/09/22  5:24 AM    Specimen: Blood   Result Value Ref Range    Total Cholesterol 148 0 - 200 mg/dL    Triglycerides 46 0 - 150 mg/dL    HDL Cholesterol 68 (H) 40 - 60 mg/dL    LDL Cholesterol  70 0 - 100 mg/dL    VLDL Cholesterol 10 5 - 40 mg/dL    LDL/HDL Ratio 1.04    Magnesium    Collection Time: 08/09/22  5:24 AM    Specimen: Blood   Result Value Ref Range    Magnesium 1.6 1.6 - 2.4 mg/dL   TSH    Collection Time: 08/09/22  5:24 AM    Specimen: Blood   Result Value Ref Range    TSH 1.670 0.270 - 4.200 uIU/mL   ECG 12 Lead    Collection Time: 08/09/22  7:36 AM   Result Value Ref Range    QT Interval 424 ms    QTC Interval 454 ms       If labs were ordered, I independently reviewed the results.        RADIOLOGY  XR Chest 1 View    Result Date: 8/8/2022  FRONTAL VIEW OF THE CHEST CLINICAL INDICATION: Chest pain. COMPARISON: 1/3/2020. FINDINGS: No focal consolidation, pleural effusion or  pneumothorax. Cardiomediastinal morphology is stable.     No acute cardiopulmonary abnormality. Electronically signed by:  Chico Damon M.D.  8/8/2022 9:49 PM Mountain Time      I ordered and reviewed the above noted radiographic studies.      I viewed images of chest x-ray which does not reveal any acute abnormalities that I can appreciate.    See radiologist's dictation for official interpretation.        PROCEDURES    Procedures    ECG 12 Lead   Preliminary Result   Test Reason : Chest Pain   Blood Pressure :   */*   mmHG   Vent. Rate :  69 BPM     Atrial Rate :  69 BPM      P-R Int : 200 ms          QRS Dur :  84 ms       QT Int : 424 ms       P-R-T Axes :  56  53  66 degrees      QTc Int : 454 ms      Normal sinus rhythm   Normal ECG   When compared with ECG of 09-AUG-2022 04:53, (Unconfirmed)   No significant change was found      Referred By:            Confirmed By:       ECG 12 Lead   Preliminary Result   Test Reason : Chest Pain   Blood Pressure :   */*   mmHG   Vent. Rate :  61 BPM     Atrial Rate :  61 BPM      P-R Int : 194 ms          QRS Dur :  78 ms       QT Int : 434 ms       P-R-T Axes :  45  53  65 degrees      QTc Int : 436 ms      Normal sinus rhythm   Nonspecific ST abnormality   Abnormal ECG   When compared with ECG of 09-AUG-2022 00:59, (Unconfirmed)   No significant change was found      Referred By:            Confirmed By:       ECG 12 Lead   Preliminary Result   Test Reason : chest pain   Blood Pressure :   */*   mmHG   Vent. Rate :  63 BPM     Atrial Rate :  63 BPM      P-R Int : 190 ms          QRS Dur :  90 ms       QT Int : 438 ms       P-R-T Axes :  52  43  58 degrees      QTc Int : 448 ms      Normal sinus rhythm   Normal ECG   When compared with ECG of 08-AUG-2022 22:58, (Unconfirmed)   premature supraventricular complexes are no longer present      Referred By: EDMD           Confirmed By:       ECG 12 Lead   Preliminary Result   Test Reason : chest pain   Blood Pressure :   */*    mmHG   Vent. Rate :  83 BPM     Atrial Rate :  83 BPM      P-R Int : 184 ms          QRS Dur :  82 ms       QT Int : 394 ms       P-R-T Axes :  56  48  47 degrees      QTc Int : 462 ms      Sinus rhythm with premature supraventricular complexes   Nonspecific ST abnormality   Abnormal ECG   When compared with ECG of 03-JAN-2020 18:39,   premature supraventricular complexes are now present      Referred By: EDMD           Confirmed By:           MEDICATIONS GIVEN IN ER    Medications   sodium chloride 0.9 % flush 10 mL (has no administration in time range)   aspirin chewable tablet 324 mg (324 mg Oral Not Given 8/8/22 2257)   heparin 84293 units/250 mL (100 units/mL) in 0.45 % NaCl infusion (12 Units/kg/hr × 56.7 kg Intravenous Currently Infusing 8/9/22 0512)   Pharmacy to Dose Heparin (has no administration in time range)   acetaminophen (TYLENOL) tablet 650 mg (has no administration in time range)   amLODIPine (NORVASC) 10 mg, lisinopril (PRINIVIL,ZESTRIL) 20 mg (has no administration in time range)   aspirin EC tablet 81 mg (has no administration in time range)   atorvastatin (LIPITOR) tablet 20 mg (has no administration in time range)   azaTHIOprine (IMURAN) tablet 50 mg (has no administration in time range)   hydroxychloroquine (PLAQUENIL) tablet 200 mg (has no administration in time range)   oxybutynin XL (DITROPAN-XL) 24 hr tablet 10 mg (has no administration in time range)   sodium chloride 0.9 % flush 10 mL (has no administration in time range)   sodium chloride 0.9 % flush 10 mL (has no administration in time range)   guaiFENesin (MUCINEX) 12 hr tablet 600 mg (has no administration in time range)   sodium chloride 0.9 % bolus 500 mL (0 mL Intravenous Stopped 8/9/22 0234)   heparin (porcine) injection 3,400 Units (3,400 Units Intravenous Given 8/9/22 0239)         PROGRESS, DATA ANALYSIS, CONSULTS, AND MEDICAL DECISION MAKING    All labs have been independently reviewed by me.  All radiology studies have  been reviewed by me and the radiologist dictating the report.   EKG's have been independently viewed and interpreted by me.            ED Course as of 08/09/22 0754   Tue Aug 09, 2022   0052 In summary is a very nice 77-year-old woman with history of hypertension, hyperlipidemia, known coronary artery disease who presents for acute onset of 5 out of 10, dull substernal chest pain radiating to her neck and mid back.  She says the pain started this evening while at rest and she is not particularly appreciated to be exertional but says it did get better with nitroglycerin that EMS gave her.  She was recently evaluated for similar chest pain on Thursday at [facility] where CTA revealed coronary calcifications but no other acute abnormalities.  She says her pain is similar to that and improved dramatically with the aspirin and nitroglycerin that EMS gave her.  She denies any fevers or systemic symptoms.  Denies leg swelling or immobilization.  Currently rates pain as moderate, dull, fluttering pain.  No other complaints at this time. [CC]      ED Course User Index  [CC] Ko Garcia MD       She arrived awake and alert mildly hypertensive otherwise vitals within normal limits.  Initial EKG shows some T wave flattening but no acute ST elevations or depressions.  Chest x-ray is clear.  She is already been treated with aspirin and nitroglycerin.  No elevation in D-dimer.  Mild hyponatremia otherwise labs generally reassuring and not actionable but on repeat troponin her troponin is now up trending with significant delta still not technically elevated though at this point she has uptrending troponin, heart score of 7, I think needs hospital admission.  Started on heparin drip.  Medicine team consulted for admission.      AS OF 07:54 EDT VITALS:    BP - 154/69  HR - 63  TEMP - 96.1 °F (35.6 °C) (Oral)  O2 SATS - 99%                  DIAGNOSIS  Final diagnoses:   Chest pain, unspecified type   Coronary  artery disease involving native heart with angina pectoris, unspecified vessel or lesion type (HCC)   Palpitations   Hypernatremia         DISPOSITION  Admit             Ko Garcia MD  08/09/22 2081

## 2022-08-09 NOTE — H&P
"    [facility] Medicine Services    History and Physical    Patient Name: Mary Saucedo  : 1945  MRN: [id number]  Primary Care Physician: Brianna Marques MD  Date of admission: 2022 10:51 PM      Subjective   Subjective     Chief Complaint:  Chest pain, palpitations     HPI:  Mary Saucedo is a 77 y.o. female w/ a hx of Sjogren's (Imuran, Plaquenil), HTN, HLD, GERD who presented to the ED w/ c/o chest pain and palpitations.   Pt c/o chest pain w/ radiation to her back and neck. Pt reports a long-standing hx of intermittent palpitations w/ associated dizziness and a sensation of \"weakness\" in her chest and neck. She usually experiences this in the mornings after she gets dressed. Today she experienced the same symptoms but had chest pressure and pain that radiated to her back. Pt c/o associated dyspnea.   Pt seen in the ED at [facility] last Thursday for the dizziness, palpitations and reports that she had a scan of her brain and chest. Scans revealed a possible T-spine impingement, a lingual tonsil cyst and \"small\" cerebral arteries. Pt was referred to Neurosurgery, ENT and Neurology. She was seen by NS and ENT Monday. Dr. Torres w/ NS scheduled pt for an MRI. ENT (Dr. BUCKLEY) assessed pt and told pt that her tonsil cyst was benign. Pt has an upcoming appointment w/ Neurology to discuss her Brain findings.   Pt follows w/ Dr. Solano w/ Cardiology at [facility]. She had a 7-day Cardiac monitor last month (2/2 dizziness, palpitations) and the findings were \"normal\".   Pt also notes that she was treated for cat bite on her right hand in mid-July. Her symptoms resolved w/ antibiotics.   Pt denies fever/chills, cough, N/V/D, abdominal pain, dysuria, edema, syncope, confusion.   Pt evaluated in the ED. EKG c/w NSR w/ T-Wave flattening. Troponin <0.010 and repeat was 0.015. CXR unremarkable. Pt admitted to the hospital medicine service for further evaluation. "     Review of Systems   Constitutional: Positive for fatigue. Negative for chills, diaphoresis, fever and unexpected weight change.   HENT: Negative.  Negative for congestion, postnasal drip, rhinorrhea, sinus pressure, sinus pain, sneezing, sore throat and trouble swallowing.    Eyes: Negative.  Negative for visual disturbance.   Respiratory: Positive for chest tightness and shortness of breath. Negative for wheezing.    Cardiovascular: Positive for chest pain and palpitations. Negative for leg swelling.   Gastrointestinal: Negative.  Negative for abdominal distention, abdominal pain, blood in stool, constipation, diarrhea, nausea and vomiting.   Endocrine: Negative.    Genitourinary: Negative.  Negative for decreased urine volume, difficulty urinating, dysuria, flank pain, frequency, hematuria, pelvic pain and urgency.   Musculoskeletal: Negative.  Negative for arthralgias, myalgias, neck pain and neck stiffness.   Skin: Negative.  Negative for wound.   Allergic/Immunologic: Positive for immunocompromised state.        Imuran and Plaquenil.    Neurological: Positive for dizziness. Negative for tremors, seizures, syncope, facial asymmetry, speech difficulty, weakness, light-headedness, numbness and headaches.   Hematological: Negative.  Does not bruise/bleed easily.   Psychiatric/Behavioral: Negative.  Negative for confusion.   All other systems reviewed and are negative.     Personal History     Past Medical History:   Diagnosis Date   • Arthritis    • Back pain    • Bronchitis     pcp diagnosed recently    • Cancer (CMS/HCC)     precancerous removed from nose    • Carotid stenosis, bilateral     Nonobstructive   • Cataracts, bilateral    • Chronic sinusitis    • Emphysema lung (CMS/HCC)    • GERD (gastroesophageal reflux disease)     no longer issue    • Heart murmur     at 18-   no longer real issue    • Hiatal hernia    • Hx: UTI (urinary tract infection)     back in oct 2016- no s/s currently    •  Hyperlipidemia    • Hypertensive disorder    • Migraine     h/o    • PMB (postmenopausal bleeding)    • Sjoegren syndrome    • Uterine fibroid    • Wears glasses    • Wrist fracture, left      Past Surgical History:   Procedure Laterality Date   • BELPHAROPTOSIS REPAIR Bilateral 1998   • BREAST LUMPECTOMY Right 1989   • BREAST LUMPECTOMY Left 07/2009   • CARDIOVASCULAR STRESS TEST      x3-4    • COLONOSCOPY      x5   • D&C HYSTEROSCOPY N/A 3/1/2017    Procedure: DILATATION AND CURETTAGE HYSTEROSCOPY;  Surgeon: Addis Mccord MD;  Location: [facility];  Service:    • ENDOSCOPY      x2   • HEMORRHOIDECTOMY  1976   • SKIN BIOPSY     • SKIN CANCER EXCISION      precancerous removed from nose    • WISDOM TOOTH EXTRACTION       Family History: family history includes Alzheimer's disease (age of onset: 80) in her mother; Arthritis in her sister; Colon cancer (age of onset: 60) in her father; Emphysema in her father; Heart attack (age of onset: 40) in her father; Lactose intolerance in her daughter; Stomach cancer in her paternal grandfather. Otherwise pertinent FHx was reviewed and unremarkable.     Social History:  reports that she quit smoking about 7 years ago. Her smoking use included cigarettes. She has a 2.50 pack-year smoking history. She has never used smokeless tobacco. She reports current alcohol use. She reports that she does not use drugs.  Social History     Social History Narrative    Caffeine:3 serving per week. Rest decaff only     Medications:  Vitamin D3, acetaminophen, amLODIPine-benazepril, aspirin, atorvastatin, azaTHIOprine, fluticasone, hydroCHLOROthiazide, hydroxychloroquine, ibuprofen, naproxen sodium, oxybutynin XL, and vitamin B-12    Allergies   Allergen Reactions   • Hydrocodone Nausea And Vomiting   • Codeine Rash     Objective   Objective     Vital Signs:   Temp:  [97.8 °F (36.6 °C)] 97.8 °F (36.6 °C)  Heart Rate:  [64-88] 64  Resp:  [16-20] 17  BP: (144-168)/(66-90) 168/90    Physical Exam  "    Constitutional: Awake, alert; non-toxic appearing   Eyes: PERRLA, sclerae anicteric, no conjunctival injection  HENT: NCAT, mucous membranes moist  Neck: Supple, no thyromegaly, no lymphadenopathy, trachea midline  Respiratory: Clear to auscultation bilaterally, nonlabored respirations   Cardiovascular: RRR, no murmurs, rubs, or gallops, no peripheral edema   Gastrointestinal: Positive bowel sounds, soft, nontender, nondistended  Musculoskeletal: Normal ROM bilaterally   Psychiatric: Appropriate affect, cooperative  Neurologic: Oriented x 3, strength symmetric in all extremities, Cranial Nerves grossly intact to confrontation, speech clear  Skin: No rashes, lesions or wounds     Results Reviewed:    EKG: NSR w/ T-Wave flattening     CXR: no acute findings    Troponin <0.010, 0.015  proBNP 160.5  Glucose 105, sodium 146, BUN/CR ratio 28.8; CMP otherwise unremarkable   Lipase 45  WBC 6.61, H/H 12.6/36.3, platelet 222    Assessment & Plan   Assessment / Plan     Active Hospital Problems    Diagnosis  POA   • **Chest pain [R07.9]  Yes   • Hypernatremia [E87.0]  Yes   • Sjogren's disease (HCC) [M35.00]  Yes     Plaquenil      • Essential hypertension [I10]  Yes   • Mixed hyperlipidemia [E78.2]  Yes   • Dizziness [R42]  Yes     Mary Saucedo is a 77 y.o. female w/ a hx of Sjogren's (Imuran, Plaquenil), HTN, HLD, GERD who presented to the ED w/ c/o chest pain and palpitations.     **Chest pain   **Dizziness, palpitations   -hx of \"normal\" 7-day cardiac monitor last month; follows w/ Dr. Solano w/ Cardiology   -seen in ED at [place] last Thursday for dizziness, palpitations- records requested (pt referred to ENT, NS and Neurology 2/2 abnormal scans- see HPI)    -EMS concerned for atrial fibrillation; no hx of A.Fib, EKG here w/ NSR  -serial EKGs  -serial troponin   -CXR unremarkable   -D.Dimer WNL   -ASA  -heparin infusion started in ED; continue per protocol   -NPO for now   -tsh, hem a1c, FLP w/ am labs " "  -Cardiology consult this morning     **Hypernatremia   -mild at 146 (previously 142 in 2020   -s/p 500ml NS bolus   -repeat STAT sodium now   -start IVF pending repeat Na  -holding HCTZ    **HTN   **HLD   -continue Lotrel  -holding HCTZ  -continue statin     **Sjogren's  -continue Imuran and Plaquenil     CODE STATUS:    Code Status and Medical Interventions:   Ordered at: 08/09/22 0357     Code Status (Patient has no pulse and is not breathing):    CPR (Attempt to Resuscitate)     Medical Interventions (Patient has pulse or is breathing):    Full Support     Discharge Blueprint (criteria for discharge):   Cleared for d/c per Cardiology.     Signature: Electronically signed by OVIDIO Miller, 08/09/22, 4:09 AM EDT.    PT ADMITTED TO TELEMETRY FLOOR. CDU NOTED CONVERTED TO FULL H&P.  Attending   Admission Attestation       I have seen and examined the patient, performing an independent face-to-face diagnostic evaluation with plan of care reviewed and developed with the advanced practice clinician (APC).      Brief Summary Statement:   Mary Saucedo is a 77 y.o. female with past medical history of Sjogren's, HTN, HLD, AND GERD.  Patient presented to [facility] with complaint of chest pain radiated into her back and neck.  She states she also noted intermittent palpitations associated with feeling of weakness in her chest and neck and dizziness.  Patient reports that the pain radiated into her back.  She had similar symptoms of CHF last Thursday and upon arrival to OSH and a CT of the neck and chest.  She was noted at that time to have possible L-spine impingement.  Also advised that she had a lingual tonsil cyst.  Patient has been referred to neurosurgery and ENT.  She also was advised that she had \"small \"cerebral arteries and has been referred to neurology.  Patient did have a recent Holter monitor at [facility] and was advised that she did not have atrial fibrillation.  Due to " patient's atypical chest pain decision was made to admit to hospital service for further evaluation management.  We will obtain CT report from [place] in the a.m.    Remainder of detailed HPI is as noted by APC and has been reviewed and/or edited by me for completeness.    Attending Physical Exam:  Constitutional: Awake, alert  Eyes: PERRLA, sclerae anicteric, no conjunctival injection  HENT: NCAT, mucous membranes moist  Neck: Supple, no thyromegaly, no lymphadenopathy, trachea midline  Respiratory: Clear to auscultation bilaterally, nonlabored respirations   Cardiovascular: RRR, no murmurs, rubs, or gallops, palpable pedal pulses bilaterally  Gastrointestinal: Positive bowel sounds, soft, nontender, nondistended  Musculoskeletal: No bilateral ankle edema, no clubbing or cyanosis to extremities  Psychiatric: Appropriate affect, cooperative  Neurologic: Oriented x 3, strength symmetric in all extremities, Cranial Nerves grossly intact to confrontation, speech clear  Skin: No rashes      Brief Assessment/Plan :  See detailed assessment and plan developed with APC which I have reviewed and/or edited for completeness.      Ida Gregory,   08/09/22

## 2022-08-09 NOTE — DISCHARGE SUMMARY
[facility] Medicine Services  DISCHARGE SUMMARY    Patient Name: Mary Saucedo  : 1945  MRN: [id number]    Date of Admission: 2022 10:51 PM  Date of Discharge: 2022  Primary Care Physician: Brianna Marques MD    Consults     Date and Time Order Name Status Description    2022  4:13 AM Inpatient Cardiology Consult Completed           Hospital Course     Presenting Problem:   Chest pain [R07.9]    Active Hospital Problems    Diagnosis  POA   • **Chest pain [R07.9]  Yes   • Hypernatremia [E87.0]  Yes   • Sjogren's disease (HCC) [M35.00]  Yes   • Essential hypertension [I10]  Yes   • Mixed hyperlipidemia [E78.2]  Yes   • Dizziness [R42]  Yes      Resolved Hospital Problems   No resolved problems to display.          Hospital Course:  Mary Saucedo is a 77 y.o. female with history of Sjogren's, admitted for dizziness and weakness.  She underwent stress test and was evaluated by cardiology.  They recommended Holter monitor and follow-up with either their office or her primary cardiologist Dr. Solano  Patient was also noted to have elevated sodium and signs of dehydration.  HCTZ was discontinued    Chest pain, resolved  Dizziness/palpitations  -Stress test negative  -May be related to noncardiac component, anxiety  -Holter monitor ordered at discharge    Hyponatremia  -S/p IVF  -DC HCTZ  -Encourage oral hydration    HTN  Dyslipidemia    Sjogren's  -Continue home meds    Discharge Follow Up Recommendations for outpatient labs/diagnostics:  PCP in 1 week  Cardiology in 1 month    Day of Discharge     HPI:   Hungry, asking if she is able to have something to eat.  Discussed increasing oral intake, she recently started a new job and has not been eating or drinking as well    Review of Systems  Gen- No fevers, chills  CV- No chest pain, palpitations  Resp- No cough, dyspnea  GI- No N/V/D, abd pain    Vital Signs:   Temp:  [96.1 °F (35.6 °C)-97.8 °F (36.6 °C)]  97.4 °F (36.3 °C)  Heart Rate:  [63-88] 74  Resp:  [16-20] 17  BP: (144-168)/(66-90) 167/82      Physical Exam:  Constitutional: No acute distress, awake, alert  HENT: NCAT, mucous membranes moist  Respiratory: Clear to auscultation bilaterally, respiratory effort normal   Cardiovascular: RRR on tele  Gastrointestinal: Soft, nontender, nondistended  Musculoskeletal: No bilateral ankle edema  Psychiatric: Appropriate affect, cooperative  Neurologic: Oriented x 3, speech clear  Skin: No rashes      Pertinent  and/or Most Recent Results     LAB RESULTS:      Lab 08/09/22  0843 08/09/22  0524 08/09/22  0240 08/08/22  2303   WBC  --  5.38 6.61 5.22   HEMOGLOBIN  --  12.3 12.6 13.1   HEMATOCRIT  --  35.5 36.3 36.9   PLATELETS  --  219 222 227   NEUTROS ABS  --  2.86 3.72 2.44   IMMATURE GRANS (ABS)  --  0.01 0.02 0.01   LYMPHS ABS  --  1.86 2.21 2.08   MONOS ABS  --  0.44 0.45 0.46   EOS ABS  --  0.16 0.16 0.19   MCV  --  95.2 96.3 93.7   PROTIME  --  13.3 17.3*  --    APTT  --   --  32.4*  --    HEPARIN ANTI-XA 0.14*  --  0.10*  --    D DIMER QUANT  --   --   --  0.33         Lab 08/09/22  0524 08/08/22 2303   SODIUM 146* 146*   POTASSIUM 3.6 4.2   CHLORIDE 113* 111*   CO2 23.0 24.0   ANION GAP 10.0 11.0   BUN 13 19   CREATININE 0.58 0.66   EGFR 93.3 90.5   GLUCOSE 101* 105*   CALCIUM 9.6 10.3   MAGNESIUM 1.6  --    HEMOGLOBIN A1C 4.90  --    TSH 1.670  --          Lab 08/08/22 2303   TOTAL PROTEIN 6.8   ALBUMIN 4.40   GLOBULIN 2.4   ALT (SGPT) 20   AST (SGOT) 22   BILIRUBIN 0.4   ALK PHOS 67   LIPASE 45         Lab 08/09/22  0524 08/09/22  0240 08/09/22  0059 08/08/22  2303   PROBNP  --   --   --  160.5   TROPONIN T <0.010  --  0.015 <0.010   PROTIME 13.3 17.3*  --   --    INR 1.02 1.42*  --   --          Lab 08/09/22  0524   CHOLESTEROL 148   LDL CHOL 70   HDL CHOL 68*   TRIGLYCERIDES 46             Brief Urine Lab Results     None        Microbiology Results (last 10 days)     Procedure Component Value - Date/Time     COVID PRE-OP / PRE-PROCEDURE SCREENING ORDER (NO ISOLATION) - Swab, Nasopharynx [[id number]]  (Normal) Collected: 08/09/22 1030    Lab Status: Final result Specimen: Swab from Nasopharynx Updated: 08/09/22 1306    Narrative:      The following orders were created for panel order COVID PRE-OP / PRE-PROCEDURE SCREENING ORDER (NO ISOLATION) - Swab, Nasopharynx.  Procedure                               Abnormality         Status                     ---------                               -----------         ------                     COVID-19 and FLU A/B PCR...[[id number]]  Normal              Final result                 Please view results for these tests on the individual orders.    COVID-19 and FLU A/B PCR - Swab, Nasopharynx [[id number]]  (Normal) Collected: 08/09/22 1030    Lab Status: Final result Specimen: Swab from Nasopharynx Updated: 08/09/22 1306     COVID19 Not Detected     Influenza A PCR Not Detected     Influenza B PCR Not Detected    Narrative:      Fact sheet for providers: https://www.fda.gov/media/928411/download    Fact sheet for patients: https://www.fda.gov/media/780332/download    Test performed by PCR.          XR Chest 1 View    Result Date: 8/8/2022  FRONTAL VIEW OF THE CHEST CLINICAL INDICATION: Chest pain. COMPARISON: 1/3/2020. FINDINGS: No focal consolidation, pleural effusion or pneumothorax. Cardiomediastinal morphology is stable.     No acute cardiopulmonary abnormality. Electronically signed by:  Chico Damon M.D.  8/8/2022 9:49 PM Mountain Time    Stress Test With Pet Myocardial Perfusion    Result Date: 8/9/2022  · LexiPet scan within normal limits. · REST EF = 57% STRESS EF = 64%. · No significant ST-T changes noted. · Significant coronary and aortic calcifications noted.        Results for orders placed during the hospital encounter of 10/25/18    Duplex Carotid Ultrasound CAR    Interpretation Summary  · Proximal right internal carotid artery plaque without significant  stenosis.  · Right internal carotid artery stenosis of 0-49%.  · Proximal left internal carotid artery plaque without significant stenosis.  · Left internal carotid artery stenosis of 0-49%.      Results for orders placed during the hospital encounter of 10/25/18    Duplex Carotid Ultrasound CAR    Interpretation Summary  · Proximal right internal carotid artery plaque without significant stenosis.  · Right internal carotid artery stenosis of 0-49%.  · Proximal left internal carotid artery plaque without significant stenosis.  · Left internal carotid artery stenosis of 0-49%.      Results for orders placed during the hospital encounter of 10/25/18    Adult Transthoracic Echo Complete W/ Cont if Necessary Per Protocol    Interpretation Summary  · Left ventricular systolic function is normal.  · Estimated EF appears to be in the range of 66 - 70%.  · Left ventricular diastolic dysfunction (grade I) consistent with impaired relaxation.  · Left atrial cavity size is mildly dilated.      Plan for Follow-up of Pending Labs/Results:     Discharge Details        Discharge Medications      Continue These Medications      Instructions Start Date   acetaminophen 325 MG tablet  Commonly known as: TYLENOL   650 mg, Oral, Every 4 Hours PRN      amLODIPine-benazepril 10-20 MG per capsule  Commonly known as: LOTREL   TK 1 C PO D      aspirin 81 MG EC tablet   81 mg, Oral, Daily, Stopped 2/21/17 per dr barr      atorvastatin 20 MG tablet  Commonly known as: LIPITOR   20 mg, Oral, Daily      azaTHIOprine 50 MG tablet  Commonly known as: IMURAN   1 tablet, Daily      fluticasone 50 MCG/ACT nasal spray  Commonly known as: FLONASE   2 sprays, Nasal, Daily PRN, havent taken in about 5 days      hydroxychloroquine 200 MG tablet  Commonly known as: PLAQUENIL   200 mg, Oral, Daily, sjainsley      levocetirizine 5 MG tablet  Commonly known as: XYZAL   5 mg, Oral, Every Evening      oxybutynin XL 10 MG 24 hr tablet  Commonly known as:  DITROPAN-XL   10 mg, Daily      vitamin B-12 1000 MCG tablet  Commonly known as: CYANOCOBALAMIN   3,000 mcg, Sublingual, Daily      Vitamin D3 50 MCG (2000 UT) tablet   1 capsule, Oral, Daily         Stop These Medications    hydroCHLOROthiazide 25 MG tablet  Commonly known as: HYDRODIURIL     ibuprofen 600 MG tablet  Commonly known as: ADVIL,MOTRIN     naproxen sodium 220 MG tablet  Commonly known as: ALEVE            Allergies   Allergen Reactions   • Hydrocodone Nausea And Vomiting   • Codeine Rash         Discharge Disposition:  Home or Self Care    Diet:  Hospital:  Diet Order   Procedures   • Diet Regular; Cardiac       Activity:      Restrictions or Other Recommendations:  Remember to hydrate, HCTZ discontinued       CODE STATUS:    Code Status and Medical Interventions:   Ordered at: 08/09/22 0357     Code Status (Patient has no pulse and is not breathing):    CPR (Attempt to Resuscitate)     Medical Interventions (Patient has pulse or is breathing):    Full Support       Future Appointments   Date Time Provider Department Center   8/17/2022  9:15 AM [place] APRN [facility]   9/15/2022 10:00 AM Todd Martins MD [facility]       Additional Instructions for the Follow-ups that You Need to Schedule     Discharge Follow-up with PCP   As directed       Currently Documented PCP:    Brianna Marques MD    PCP Phone Number:    [phone]     Follow Up Details: 1 week               Caitlyn Nichols DO  08/09/22      Time Spent on Discharge:  I spent  40  minutes on this discharge activity which included: face-to-face encounter with the patient, reviewing the data in the system, coordination of the care with the nursing staff as well as consultants, documentation, and entering orders.

## 2022-08-10 LAB
QT INTERVAL: 424 MS
QT INTERVAL: 434 MS
QTC INTERVAL: 436 MS
QTC INTERVAL: 454 MS

## 2022-08-17 ENCOUNTER — OFFICE VISIT (OUTPATIENT)
Dept: CARDIOLOGY | Facility: HOSPITAL | Age: 77
End: 2022-08-17

## 2022-08-17 ENCOUNTER — HOSPITAL ENCOUNTER (OUTPATIENT)
Dept: CARDIOLOGY | Facility: HOSPITAL | Age: 77
Discharge: HOME | End: 2022-08-17

## 2022-08-17 VITALS
OXYGEN SATURATION: 97 % | HEIGHT: 62 IN | RESPIRATION RATE: 18 BRPM | WEIGHT: 124.7 LBS | TEMPERATURE: 97.7 F | DIASTOLIC BLOOD PRESSURE: 63 MMHG | BODY MASS INDEX: 22.95 KG/M2 | HEART RATE: 70 BPM | SYSTOLIC BLOOD PRESSURE: 134 MMHG

## 2022-08-17 DIAGNOSIS — R00.2 PALPITATIONS: ICD-10-CM

## 2022-08-17 DIAGNOSIS — R42 DIZZINESS: Primary | ICD-10-CM

## 2022-08-17 DIAGNOSIS — I10 ESSENTIAL HYPERTENSION: ICD-10-CM

## 2022-08-17 DIAGNOSIS — R00.0 TACHYCARDIA: ICD-10-CM

## 2022-08-17 DIAGNOSIS — R42 DIZZINESS: ICD-10-CM

## 2022-08-17 PROCEDURE — 99214 OFFICE O/P EST MOD 30 MIN: CPT | Performed by: NURSE PRACTITIONER

## 2022-08-17 RX ORDER — CRANBERRY CONC/ASCORBIC ACID 4200-20 MG
1 CAPSULE ORAL DAILY
COMMUNITY

## 2022-08-17 RX ORDER — BIOTIN 1 MG
1000 TABLET ORAL DAILY
COMMUNITY

## 2022-08-17 NOTE — PROGRESS NOTES
[facility] Heart Monitor Documentation    Mary Saucedo  1945  [id number]  08/17/22      [] ZIO XT Patch  Model [id number] Prescribed for  Days    · Serial Number: (N + 9 Digits) N   · Apply-By Date on Box:   · USPS Tracking Number:   · USPS Tracking        [x] [employer]ian MINI PLUS Mobile Cardiac Telemetry  Model [id number] Prescribed for 30 Days    · Serial Number: (BGM + 7 Digits) [id number]  · Shipped-By Date on Box: 07/29/2022  · UPS Tracking Number: [id number]  · UPS Tracking      [] [employer] MINI Holter Monitor  Model BGMINIEL Prescribed for  Days    · Serial Number: (7 Digits)   · Shipped-By Date on Box:   · UPS Tracking Number: [id number]  · UPS Tracking        This monitor was applied to the patient's chest and checked for proper functioning.  Ms. Mary Saucedo was instructed in the proper use of this monitor.  She was given the opportunity to ask questions and left the office with the device 's instruction manual.    Krystal [facility], [facility], 09:55 EDT, 08/17/22                  [facility] Hospital[facility] 8.8.2019

## 2022-08-22 NOTE — PROGRESS NOTES
"Chief Complaint  Chest Pain and Follow-up    Subjective    History of Present Illness {CC  Problem List  Visit  Diagnosis   Encounters  Notes  Medications  Labs  Result Review Imaging  Media :23}       History of Present Illness   77-year-old female presents the office today for ongoing evaluation of her dizziness and chest pain.  Recently admitted to [facility] for chest pain and underwent stress test which was negative for ischemia.  She reports her chest pain has resolved.  Patient does note ongoing intermittent dizziness occurring on a daily basis.  Also reports intermittent palpitations.  During hospital stay  Patient was hyponatremic and HCTZ was discontinued.  She also has history of hypertension, dyslipidemia, Sjogren's  Objective     Vital Signs:   Vitals:    08/17/22 0858   BP: 134/63   BP Location: Left arm   Patient Position: Sitting   Cuff Size: Adult   Pulse: 70   Resp: 18   Temp: 97.7 °F (36.5 °C)   TempSrc: Temporal   SpO2: 97%   Weight: 56.6 kg (124 lb 11.2 oz)   Height: 157.5 cm (62\")     Body mass index is 22.81 kg/m².  Physical Exam  Vitals and nursing note reviewed.   Constitutional:       Appearance: Normal appearance.   HENT:      Head: Normocephalic.   Eyes:      Pupils: Pupils are equal, round, and reactive to light.   Cardiovascular:      Rate and Rhythm: Normal rate and regular rhythm.      Pulses: Normal pulses.      Heart sounds: Normal heart sounds. No murmur heard.  Pulmonary:      Effort: Pulmonary effort is normal.      Breath sounds: Normal breath sounds.   Abdominal:      General: Bowel sounds are normal.      Palpations: Abdomen is soft.   Musculoskeletal:         General: Normal range of motion.      Cervical back: Normal range of motion.      Right lower leg: No edema.      Left lower leg: No edema.   Skin:     General: Skin is warm and dry.      Capillary Refill: Capillary refill takes less than 2 seconds.   Neurological:      Mental Status: She is alert " and oriented to person, place, and time.   Psychiatric:         Mood and Affect: Mood normal.         Thought Content: Thought content normal.              Result Review  Data Reviewed:{ Labs  Result Review  Imaging  Med Tab  Media :23}     Stress Test With Pet Myocardial Perfusion (08/09/2022 12:52)  Adult Transthoracic Echo Complete W/ Cont if Necessary Per Protocol (08/09/2022 14:42)  Basic Metabolic Panel (08/09/2022 05:24)  CBC Auto Differential (08/09/2022 05:24)  Protime-INR (08/09/2022 05:24)  Hemoglobin A1c (08/09/2022 05:24)  Lipid Panel (08/09/2022 05:24)           Assessment and Plan {CC Problem List  Visit Diagnosis  ROS  Review (Popup)  Health Maintenance  Quality  BestPractice  Medications  SmartSets  SnapShot Encounters  Media :23}   1. Dizziness    - Mobile Cardiac Outpatient Telemetry; Future    2. Tachycardia    - Mobile Cardiac Outpatient Telemetry; Future    3. Palpitations  - Mobile Cardiac Outpatient Telemetry; Future    4. htn   Well-controlled on Lo-Trol.  Continue to monitor closely    Follow Up {Instructions Charge Capture  Follow-up Communications :23}   Return if symptoms worsen or fail to improve.    Patient was given instructions and counseling regarding her condition or for health maintenance advice. Please see specific information pulled into the AVS if appropriate.  Patient was instructed to call the [facility] with any questions, concerns, or worsening symptoms.

## 2022-09-08 ENCOUNTER — TELEPHONE (OUTPATIENT)
Dept: CARDIOLOGY | Facility: HOSPITAL | Age: 77
End: 2022-09-08

## 2022-09-08 NOTE — TELEPHONE ENCOUNTER
afib noted on cardiac event monitor. Begin eliquis 5 mg bid due to chads vasc of 3. Stop asa. Continue wearing event monitor. Patient verbalized understanding.

## 2022-09-09 ENCOUNTER — TELEPHONE (OUTPATIENT)
Dept: CARDIOLOGY | Facility: HOSPITAL | Age: 77
End: 2022-09-09

## 2022-09-09 NOTE — TELEPHONE ENCOUNTER
----- Message from Catrina Cuenca MA sent at 9/9/2022  2:40 PM EDT -----  Regarding: RE:  Card sent to [facility]. Confirmation received and placed in medical records  ----- Message -----  From: Venice Phoenix APRN  Sent: 9/8/2022   2:16 PM EDT  To: Catrina Cuenca MA  Subject: RE:                                              I will send you an email with the link.  ----- Message -----  From: Catrina Cuenca MA  Sent: 9/8/2022   2:14 PM EDT  To: OVIDIO Snowden    I have looked in the cabinet and can not find any free 30 day supply for Eliquis. Suzy said they are online. I looked and it is asking questions as if I am the patient.  ----- Message -----  From: Venice Phoenix APRN  Sent: 9/8/2022   1:29 PM EDT  To: Catrina Cuenca MA    Please fax eliquis free card to [facility]. Thanks

## 2022-09-15 ENCOUNTER — OFFICE VISIT (OUTPATIENT)
Dept: CARDIOLOGY | Facility: CLINIC | Age: 77
End: 2022-09-15

## 2022-09-15 VITALS
HEART RATE: 64 BPM | DIASTOLIC BLOOD PRESSURE: 68 MMHG | BODY MASS INDEX: 22.52 KG/M2 | WEIGHT: 122.4 LBS | SYSTOLIC BLOOD PRESSURE: 130 MMHG | HEIGHT: 62 IN | OXYGEN SATURATION: 95 %

## 2022-09-15 DIAGNOSIS — I48.0 PAROXYSMAL ATRIAL FIBRILLATION: Primary | ICD-10-CM

## 2022-09-15 DIAGNOSIS — I10 PRIMARY HYPERTENSION: ICD-10-CM

## 2022-09-15 DIAGNOSIS — E78.2 MIXED HYPERLIPIDEMIA: ICD-10-CM

## 2022-09-15 PROCEDURE — 99214 OFFICE O/P EST MOD 30 MIN: CPT | Performed by: INTERNAL MEDICINE

## 2022-09-15 RX ORDER — BISOPROLOL FUMARATE 5 MG/1
5 TABLET, FILM COATED ORAL DAILY
Qty: 90 TABLET | Refills: 3 | Status: SHIPPED | OUTPATIENT
Start: 2022-09-15 | End: 2022-12-19 | Stop reason: SDUPTHER

## 2022-09-15 NOTE — PROGRESS NOTES
[facility] Cardiology  Office Progress Note  Mary Saucedo  1945  [address] [facility] [address]       Visit Date: 09/15/22    PCP: Brianna Marques MD  [address] [facility] [address]    IDENTIFICATION: A 77 y.o. female  .   Retired from  at [employer].  Currently part-time worker at [employer]    PROBLEM LIST:   1. Coronary artery disease  1. 10/18 treadmill stress test: No chest pain or discomfort.  No significant EKG changes, occasional PVCs and PACs.  Appropriate heart rate and blood pressure response.  THR achieved at 6:26.  Max heart rate 131, 89% of MPHR.  Arrhythmias were not significant.  Negative stress ekg for ischemia.  2. 1/20 exercise MPS:  normal myocardial perfusion study with no evidence of ischemia.  LVEF = 70%.  Coronary calcification noted on CT attenuation images.    3. 8/22 LexiPet wnl EF 64% marked coronary Ca2+  2. Dizziness/palpitations  1. 10/18 30-day MCOT: Baseline rhythm NSR with AVG 73, min 46, max 115.  Otherwise normal Holter monitor.  2. 8/22 holter nonsustained afib  3. Cardiac murmur   1. 10/18 2D echo: LV systolic function WNL (66 to 70%).  Grade 1 diastolic dysfunction.  LA cavity mildly dilated.  2. 8/22 echo EF 60% rvsp 23 IR  4. Carotid artery disease  1. 10/18 carotid US: Bilateral ICA stenosis of 0-49%.  Antegrade vertebrals bilaterally.  5. Hypertension  6. Hyperlipidemia  7. Former smoker-cessation 2015  8. Emphysema/COPD  9. GERD  10. Hiatal hernia   11. Sjogren's syndrome  12. Surgical   1. blepharoptosis repair  2. Breast lumpectomy  3. D&C hysteroscopy  4. Hemorrhoidectomy  5. [place] tooth extraction           CC:   Chief Complaint   Patient presents with   • Hospital Follow Up Visit     Afib   • Results     Holter   • Surgical Clearance     Neck Fusion       Allergies  Allergies   Allergen Reactions   • Hydrocodone Nausea And Vomiting   • Codeine Rash       Current  Medications    Current Outpatient Medications:   •  acetaminophen (TYLENOL) 325 MG tablet, Take 2 tablets by mouth Every 4 (Four) Hours As Needed for mild pain (1-3)., Disp: 1 bottle, Rfl: 0  •  amLODIPine-benazepril (LOTREL) 10-20 MG per capsule, TK 1 C PO D, Disp: , Rfl: 11  •  apixaban (ELIQUIS) 5 MG tablet tablet, Take 1 tablet by mouth 2 (Two) Times a Day., Disp: 180 tablet, Rfl: 3  •  atorvastatin (LIPITOR) 20 MG tablet, Take 1 tablet by mouth Daily., Disp: 90 tablet, Rfl: 3  •  Biotin 1000 MCG tablet, Take 1,000 mcg by mouth Daily., Disp: , Rfl:   •  Cholecalciferol (VITAMIN D3) 2000 UNITS tablet, Take 1 capsule by mouth Daily., Disp: , Rfl:   •  Cranberry-Vitamin C-Vitamin E (Cranberry Plus Vitamin C) 4200-20-3 MG-MG-UNIT capsule, Take 1 tablet by mouth Daily., Disp: , Rfl:   •  Cyanocobalamin (Vitamin B-12) 5000 MCG tablet dispersible, Place 5,000 mcg under the tongue Daily., Disp: , Rfl:   •  fluticasone (FLONASE) 50 MCG/ACT nasal spray, 2 sprays into the nostril(s) as directed by provider Daily As Needed. havent taken in about 5 days, Disp: , Rfl:   •  levocetirizine (XYZAL) 5 MG tablet, Take 5 mg by mouth Every Evening., Disp: , Rfl:   •  bisoprolol (ZEBeta) 5 MG tablet, Take 1 tablet by mouth Daily., Disp: 90 tablet, Rfl: 3      History of Present Illness   Mary Saucedo is a 77 y.o. year old female here for hospital follow up.  She had a monitor placed by heart valve center revealed nonsustained atrial fibrillation.  She was relegated to Eliquis 5 twice daily.  She continues to have intermittent at rest palpitations fluttering.  She is scheduled to have cervical spine surgery this upcoming week    Pt denies any chest pain, dyspnea, dyspnea on exertion, orthopnea, PND, palpitations, lower extremity edema, or claudication.      OBJECTIVE:  Vitals:    09/15/22 1001   BP: 130/68   BP Location: Right arm   Patient Position: Sitting   Pulse: 64   SpO2: 95%   Weight: 55.5 kg (122 lb 6.4 oz)   Height:  "157.5 cm (62\")     Body mass index is 22.39 kg/m².    Constitutional:       Appearance: Healthy appearance. Not in distress.   Neck:      Vascular: No JVR. JVD normal.   Pulmonary:      Effort: Pulmonary effort is normal.      Breath sounds: Normal breath sounds. No wheezing. No rhonchi. No rales.   Chest:      Chest wall: Not tender to palpatation.   Cardiovascular:      PMI at left midclavicular line. Normal rate. Regular rhythm. Normal S1. Normal S2.      Murmurs: There is no murmur.      No gallop. No click. No rub.   Pulses:     Intact distal pulses.   Edema:     Peripheral edema absent.   Abdominal:      General: Bowel sounds are normal.      Palpations: Abdomen is soft.      Tenderness: There is no abdominal tenderness.   Musculoskeletal: Normal range of motion.         General: No tenderness. Skin:     General: Skin is warm and dry.   Neurological:      General: No focal deficit present.      Mental Status: Alert and oriented to person, place and time.         Diagnostic Data:  Procedures      ASSESSMENT:   Diagnosis Plan   1. Paroxysmal atrial fibrillation (HCC)     2. Primary hypertension     3. Mixed hyperlipidemia         PLAN:  Paroxysmal A. fib [id number] 4 continue systemic anticoagulation bisoprolol 5 daily to her current regimen    Hypertension controlled Lotrel    Mixed dyslipidemia controlled on statin therapy    Patient be acceptable cardiac risk proposed cervical spine surgery she will need to hold NOAC greater than 48 hours and then to be reinitiated per the recommendation of neurosurgeon          Todd Martins MD, FACC    "

## 2022-10-11 PROCEDURE — 93228 REMOTE 30 DAY ECG REV/REPORT: CPT | Performed by: INTERNAL MEDICINE

## 2022-10-31 ENCOUNTER — TELEPHONE (OUTPATIENT)
Dept: CARDIOLOGY | Facility: CLINIC | Age: 77
End: 2022-10-31

## 2022-12-19 RX ORDER — BISOPROLOL FUMARATE 5 MG/1
5 TABLET, FILM COATED ORAL DAILY
Qty: 10 TABLET | Refills: 0 | Status: SHIPPED | OUTPATIENT
Start: 2022-12-19

## 2023-01-31 ENCOUNTER — TELEPHONE (OUTPATIENT)
Dept: CARDIOLOGY | Facility: CLINIC | Age: 78
End: 2023-01-31
Payer: MEDICARE

## 2023-01-31 NOTE — TELEPHONE ENCOUNTER
Pt needs cardiac clearance for retinal procedure with EMILY with Dr. Ritchie office at [facility].     Pt states she is at 2% or less of Afib from her apple watch.     Please advise for clearance and instructions for Eliquis.     LVM to Dr. Dominguez office for fax number

## 2023-07-26 RX ORDER — BISOPROLOL FUMARATE 5 MG/1
TABLET, FILM COATED ORAL
Qty: 90 TABLET | Refills: 3 | Status: SHIPPED | OUTPATIENT
Start: 2023-07-26

## 2023-10-02 RX ORDER — APIXABAN 5 MG/1
TABLET, FILM COATED ORAL
Qty: 180 TABLET | Refills: 3 | Status: SHIPPED | OUTPATIENT
Start: 2023-10-02

## 2023-11-02 RX ORDER — BISOPROLOL FUMARATE 5 MG/1
5 TABLET, FILM COATED ORAL DAILY
Qty: 90 TABLET | Refills: 3 | Status: SHIPPED | OUTPATIENT
Start: 2023-11-02

## 2024-01-02 ENCOUNTER — TELEPHONE (OUTPATIENT)
Dept: CARDIOLOGY | Facility: CLINIC | Age: 79
End: 2024-01-02
Payer: MEDICARE

## 2024-01-02 NOTE — TELEPHONE ENCOUNTER
Accepted appt date and time of Jan 5th at 115 pm. Notified of recording heart rates and bp at home.Sent to 's  to put on the schedule.

## 2024-01-02 NOTE — TELEPHONE ENCOUNTER
Caller: Mary Saucedo    Relationship to patient: Self    Best call back number: [phone]    Chief complaint: PATIENT CONCERNED WITH AFIB. STATES HER APPLE WATCH HAS BEEN MONITORING IT. WOULD ALSO LIKE TO DISCUSS A PAIN IN HER CHEST AND A FLUCTUATING HEART RATE.     Type of visit: FU     Requested date: ASAP

## 2024-01-02 NOTE — TELEPHONE ENCOUNTER
Offer 1/5 @ 1:15 PM appt with [facility]  Record heart rates and BP at home

## 2024-01-02 NOTE — TELEPHONE ENCOUNTER
Requesting an appt.Her apple watch report shows A-Fib 25 % of the time. She gets a weekly reading every Monday. Had chest pain under her Rt breast yesterday. Reports chest pain 3-4 times in the past week. If she lays on her Lt side she can feel her heart beat.Denies soa and palpations. Does report fatigue.Instructed her to go to the ED if she has chest pain. Please advise if any further instruction.

## 2024-01-05 ENCOUNTER — OFFICE VISIT (OUTPATIENT)
Dept: CARDIOLOGY | Facility: CLINIC | Age: 79
End: 2024-01-05
Payer: MEDICARE

## 2024-01-05 VITALS
BODY MASS INDEX: 22.82 KG/M2 | SYSTOLIC BLOOD PRESSURE: 142 MMHG | OXYGEN SATURATION: 98 % | DIASTOLIC BLOOD PRESSURE: 62 MMHG | WEIGHT: 124 LBS | HEIGHT: 62 IN | HEART RATE: 47 BPM

## 2024-01-05 DIAGNOSIS — I10 PRIMARY HYPERTENSION: ICD-10-CM

## 2024-01-05 DIAGNOSIS — I20.9 ANGINA PECTORIS: Primary | ICD-10-CM

## 2024-01-05 DIAGNOSIS — E78.2 MIXED HYPERLIPIDEMIA: ICD-10-CM

## 2024-01-05 DIAGNOSIS — I48.0 PAROXYSMAL ATRIAL FIBRILLATION: ICD-10-CM

## 2024-01-05 PROCEDURE — 99214 OFFICE O/P EST MOD 30 MIN: CPT | Performed by: INTERNAL MEDICINE

## 2024-01-05 PROCEDURE — 3078F DIAST BP <80 MM HG: CPT | Performed by: INTERNAL MEDICINE

## 2024-01-05 PROCEDURE — 3077F SYST BP >= 140 MM HG: CPT | Performed by: INTERNAL MEDICINE

## 2024-01-05 RX ORDER — TRIAMCINOLONE ACETONIDE 1 MG/G
1 CREAM TOPICAL 2 TIMES DAILY
COMMUNITY
End: 2024-01-05 | Stop reason: SDDI

## 2024-01-05 NOTE — PROGRESS NOTES
[facility] Cardiology  Office Progress Note  Mary Saucedo  1945  [address] [facility] [address]       Visit Date: 01/05/24    PCP: Brianna Marques MD  [address] [facility] [address]    IDENTIFICATION: A 78 y.o. female .   Retired from  at [employer].  Currently part-time worker at [employer]    PROBLEM LIST:      Coronary artery disease  10/18 GXT: No chest pain or discomfort.    Max heart rate 131, 89% of MPHR.  Arrhythmias were not significant.  Negative stress ekg for ischemia.  1/20 exercise MPS:  normal myocardial perfusion .  LVEF = 70%.  Coronary calcification noted on CT attenuation images.    8/22 LexiPet wnl EF 64% marked coronary Ca2+  Dizziness/palpitations  10/18 30-day MCOT: Baseline rhythm NSR with AVG 73, min 46, max 115.  Otherwise normal Holter monitor.  8/22 holter nonsustained afib  10/22 MCOT 30d: AF <1%, total 52 minutes  51/71/120  Cardiac murmur   10/18 2D echo: LV systolic function WNL (66 to 70%).  Grade 1 diastolic dysfunction.  LA cavity mildly dilated.  8/22 echo EF 60% rvsp 23 IR  Carotid artery disease  10/18 carotid US: Bilateral ICA stenosis of 0-49%.  Antegrade vertebrals bilaterally.  Hypertension  Hyperlipidemia     8/22 148/46/68/70   Former smoker-cessation 2015  Emphysema/COPD  GERD  Hiatal hernia   Sjogren's syndrome  Surgical   blepharoptosis repair  Breast lumpectomy  D&C hysteroscopy  Hemorrhoidectomy  [place] tooth extraction        CC:   Chief Complaint   Patient presents with    Coronary Artery Disease    Chest Pain       Allergies  Allergies   Allergen Reactions    Hydrocodone Nausea And Vomiting    Oxycodone Hallucinations and Other (See Comments)    Oxycodone-Acetaminophen Nausea And Vomiting and Other (See Comments)    Codeine Rash       Current Medications    Current Outpatient Medications:     acetaminophen (TYLENOL) 325 MG tablet, Take 2 tablets by mouth Every 4  "(Four) Hours As Needed for mild pain (1-3)., Disp: 1 bottle, Rfl: 0    amLODIPine-benazepril (LOTREL) 10-20 MG per capsule, Take 1 capsule by mouth Daily., Disp: , Rfl:     apixaban (Eliquis) 5 MG tablet tablet, Take 1 tablet by mouth 2 (Two) Times a Day., Disp: 180 tablet, Rfl: 3    atorvastatin (LIPITOR) 20 MG tablet, Take 1 tablet by mouth Daily., Disp: 90 tablet, Rfl: 3    bisoprolol (ZEBeta) 5 MG tablet, Take 1 tablet by mouth Daily., Disp: 90 tablet, Rfl: 3    busPIRone (BUSPAR) 5 MG tablet, buspirone 5 mg tablet, Disp: , Rfl:     Cholecalciferol (VITAMIN D3) 2000 UNITS tablet, Take 1 tablet by mouth Daily., Disp: , Rfl:     Cyanocobalamin (Vitamin B-12) 5000 MCG tablet dispersible, Place 1 tablet under the tongue Daily., Disp: , Rfl:     fexofenadine (ALLEGRA) 180 MG tablet, Take 1 tablet every day by oral route for 30 days., Disp: , Rfl:     gabapentin (NEURONTIN) 100 MG capsule, PRN, Disp: , Rfl:     ipratropium (ATROVENT) 0.06 % nasal spray, ipratropium bromide 42 mcg (0.06 %) nasal spray  USE 2 SPRAYS IN EACH NOSTRIL THREE TIMES DAILY, Disp: , Rfl:     levocetirizine (XYZAL) 5 MG tablet, Take 1 tablet by mouth Every Evening., Disp: , Rfl:     montelukast (SINGULAIR) 10 MG tablet, , Disp: , Rfl:       History of Present Illness   Mary Saucedo is a 78 y.o. year old female here for fu.   Presents with her excessive worry.  She does note potentially will anginal equivalent with occasional chest discomfort that she has had she states her whole life but has become more frequent.  States that typically the discomfort can radiate into her left arm but it goes away on its own.  She brings her robust accumulation of cardiovascular metrics including heart rate blood pressure and including nighttime measurements.    OBJECTIVE:  Vitals:    01/05/24 1247   BP: 142/62   BP Location: Right arm   Patient Position: Sitting   Pulse: (!) 47   SpO2: 98%   Weight: 56.2 kg (124 lb)   Height: 157.5 cm (62.01\")     Body " mass index is 22.67 kg/m².    Constitutional:       Appearance: Healthy appearance. Not in distress.   Neck:      Vascular: No JVR. JVD normal.   Pulmonary:      Effort: Pulmonary effort is normal.      Breath sounds: Normal breath sounds. No wheezing. No rhonchi. No rales.   Chest:      Chest wall: Not tender to palpatation.   Cardiovascular:      PMI at left midclavicular line. Normal rate. Regular rhythm. Normal S1. Normal S2.       Murmurs: There is no murmur.      No gallop.  No click. No rub.   Pulses:     Intact distal pulses.   Edema:     Peripheral edema absent.   Abdominal:      General: Bowel sounds are normal.      Palpations: Abdomen is soft.      Tenderness: There is no abdominal tenderness.   Musculoskeletal: Normal range of motion.         General: No tenderness. Skin:     General: Skin is warm and dry.   Neurological:      General: No focal deficit present.      Mental Status: Alert and oriented to person, place and time.         Diagnostic Data:  Procedures        ASSESSMENT:   Diagnosis Plan   1. Angina pectoris        2. Primary hypertension        3. Mixed hyperlipidemia        4. Paroxysmal atrial fibrillation              PLAN:  Anginal equivalent risk stratification with Cardiolite stress test    Hypertension controlled currently on bisoprolol Lotrel    Mixed dyslipidemia controlled on statin therapy    Paroxysmal A-fib [id number] 3 continued bisoprolol with first-degree AV block occasional PAC and apixaban          Todd Martins MD, [facility]

## 2024-01-17 ENCOUNTER — HOSPITAL ENCOUNTER (OUTPATIENT)
Dept: CARDIOLOGY | Facility: HOSPITAL | Age: 79
Discharge: HOME | End: 2024-01-17
Payer: MEDICARE

## 2024-01-17 VITALS
DIASTOLIC BLOOD PRESSURE: 82 MMHG | SYSTOLIC BLOOD PRESSURE: 136 MMHG | BODY MASS INDEX: 22.8 KG/M2 | WEIGHT: 123.9 LBS | HEART RATE: 83 BPM | HEIGHT: 62 IN

## 2024-01-17 DIAGNOSIS — I20.9 ANGINA PECTORIS: ICD-10-CM

## 2024-01-17 LAB
BH CV REST NUCLEAR ISOTOPE DOSE: 9.8 MCI
BH CV STRESS BP STAGE 1: NORMAL
BH CV STRESS BP STAGE 2: NORMAL
BH CV STRESS DURATION MIN STAGE 1: 3
BH CV STRESS DURATION MIN STAGE 2: 3
BH CV STRESS DURATION MIN STAGE 3: 0
BH CV STRESS DURATION SEC STAGE 1: 0
BH CV STRESS DURATION SEC STAGE 2: 0
BH CV STRESS DURATION SEC STAGE 3: 37
BH CV STRESS GRADE STAGE 1: 10
BH CV STRESS GRADE STAGE 2: 12
BH CV STRESS GRADE STAGE 3: 14
BH CV STRESS HR STAGE 1: 86
BH CV STRESS HR STAGE 2: 98
BH CV STRESS HR STAGE 3: 102
BH CV STRESS METS STAGE 1: 5
BH CV STRESS METS STAGE 2: 7.5
BH CV STRESS METS STAGE 3: 10
BH CV STRESS NUCLEAR ISOTOPE DOSE: 31.9 MCI
BH CV STRESS O2 STAGE 1: 95
BH CV STRESS O2 STAGE 2: 95
BH CV STRESS O2 STAGE 3: 93
BH CV STRESS PROTOCOL 1: NORMAL
BH CV STRESS PROTOCOL 2 BP STAGE 2: NORMAL
BH CV STRESS PROTOCOL 2 BP STAGE 3: 82
BH CV STRESS PROTOCOL 2 COMMENTS STAGE 1: NORMAL
BH CV STRESS PROTOCOL 2 DOSE REGADENOSON STAGE 1: 0.4
BH CV STRESS PROTOCOL 2 DURATION MIN STAGE 1: 1
BH CV STRESS PROTOCOL 2 DURATION MIN STAGE 2: 1
BH CV STRESS PROTOCOL 2 DURATION MIN STAGE 3: 1
BH CV STRESS PROTOCOL 2 DURATION MIN STAGE 4: 1
BH CV STRESS PROTOCOL 2 HR STAGE 1: 81
BH CV STRESS PROTOCOL 2 HR STAGE 2: 82
BH CV STRESS PROTOCOL 2 HR STAGE 3: NORMAL
BH CV STRESS PROTOCOL 2 O2 STAGE 1: 93
BH CV STRESS PROTOCOL 2 O2 STAGE 2: 92
BH CV STRESS PROTOCOL 2 O2 STAGE 3: 98
BH CV STRESS PROTOCOL 2 STAGE 1: 1
BH CV STRESS PROTOCOL 2 STAGE 2: 2
BH CV STRESS PROTOCOL 2 STAGE 3: 3
BH CV STRESS PROTOCOL 2 STAGE 4: 4
BH CV STRESS PROTOCOL 2: NORMAL
BH CV STRESS RECOVERY BP: NORMAL MMHG
BH CV STRESS RECOVERY HR: 85 BPM
BH CV STRESS RECOVERY O2: 96 %
BH CV STRESS SPEED STAGE 1: 1.7
BH CV STRESS SPEED STAGE 2: 2.5
BH CV STRESS SPEED STAGE 3: 3.4
BH CV STRESS STAGE 1: 1
BH CV STRESS STAGE 2: 2
BH CV STRESS STAGE 3: 3
LV EF NUC BP: 71 %
MAXIMAL PREDICTED HEART RATE: 142 BPM
PERCENT MAX PREDICTED HR: 72.54 %
STRESS BASELINE BP: NORMAL MMHG
STRESS BASELINE HR: 60 BPM
STRESS O2 SAT REST: 98 %
STRESS PERCENT HR: 85 %
STRESS POST EXERCISE DUR MIN: 4 MIN
STRESS POST EXERCISE DUR SEC: 0 SEC
STRESS POST O2 SAT PEAK: 94 %
STRESS POST PEAK BP: NORMAL MMHG
STRESS POST PEAK HR: 103 BPM
STRESS TARGET HR: 121 BPM

## 2024-01-17 PROCEDURE — A9500 TC99M SESTAMIBI: HCPCS | Performed by: INTERNAL MEDICINE

## 2024-01-17 PROCEDURE — 93017 CV STRESS TEST TRACING ONLY: CPT

## 2024-01-17 PROCEDURE — 0 TECHNETIUM SESTAMIBI: Performed by: INTERNAL MEDICINE

## 2024-01-17 PROCEDURE — 78452 HT MUSCLE IMAGE SPECT MULT: CPT

## 2024-01-17 PROCEDURE — 25010000002 REGADENOSON 0.4 MG/5ML SOLUTION: Performed by: INTERNAL MEDICINE

## 2024-01-17 RX ORDER — REGADENOSON 0.08 MG/ML
0.4 INJECTION, SOLUTION INTRAVENOUS ONCE
Status: COMPLETED | OUTPATIENT
Start: 2024-01-17 | End: 2024-01-17

## 2024-01-17 RX ADMIN — TECHNETIUM TC 99M SESTAMIBI 1 DOSE: 1 INJECTION INTRAVENOUS at 10:20

## 2024-01-17 RX ADMIN — REGADENOSON 0.4 MG: 0.08 INJECTION, SOLUTION INTRAVENOUS at 10:17

## 2024-01-17 RX ADMIN — TECHNETIUM TC 99M SESTAMIBI 1 DOSE: 1 INJECTION INTRAVENOUS at 08:10

## 2024-01-18 ENCOUNTER — TELEPHONE (OUTPATIENT)
Dept: CARDIOLOGY | Facility: CLINIC | Age: 79
End: 2024-01-18
Payer: MEDICARE

## 2024-01-18 NOTE — TELEPHONE ENCOUNTER
Patient notified and verbalized understanding. Also discussed the symptoms of atrial fibrillation.  
Per Dr. Martins:  Stress test remains low risk w nl lvef at current.       
s/p head trauma few days ago, with persistent headache, dizziness, no focal neuro deficits on exam. no vomiting. also with pain to great toe  -check CT  -xray  -percocet

## 2024-01-22 RX ORDER — BISOPROLOL FUMARATE 5 MG/1
5 TABLET, FILM COATED ORAL DAILY
Qty: 90 TABLET | Refills: 3 | Status: SHIPPED | OUTPATIENT
Start: 2024-01-22

## 2024-02-27 ENCOUNTER — HOSPITAL ENCOUNTER (EMERGENCY)
Facility: HOSPITAL | Age: 79
Discharge: HOME | End: 2024-02-27
Attending: EMERGENCY MEDICINE | Admitting: EMERGENCY MEDICINE
Payer: MEDICARE

## 2024-02-27 ENCOUNTER — APPOINTMENT (OUTPATIENT)
Dept: GENERAL RADIOLOGY | Facility: HOSPITAL | Age: 79
End: 2024-02-27
Payer: MEDICARE

## 2024-02-27 VITALS
TEMPERATURE: 98.8 F | HEIGHT: 62 IN | RESPIRATION RATE: 18 BRPM | DIASTOLIC BLOOD PRESSURE: 64 MMHG | OXYGEN SATURATION: 94 % | SYSTOLIC BLOOD PRESSURE: 139 MMHG | BODY MASS INDEX: 23.19 KG/M2 | HEART RATE: 54 BPM | WEIGHT: 126 LBS

## 2024-02-27 DIAGNOSIS — Z86.79 HISTORY OF HYPERTENSION: ICD-10-CM

## 2024-02-27 DIAGNOSIS — M35.00 SJOGREN'S SYNDROME, WITH UNSPECIFIED ORGAN INVOLVEMENT: ICD-10-CM

## 2024-02-27 DIAGNOSIS — Z79.01 CHRONIC ANTICOAGULATION: ICD-10-CM

## 2024-02-27 DIAGNOSIS — Z87.19 HISTORY OF GASTROESOPHAGEAL REFLUX (GERD): ICD-10-CM

## 2024-02-27 DIAGNOSIS — I48.0 PAF (PAROXYSMAL ATRIAL FIBRILLATION): ICD-10-CM

## 2024-02-27 DIAGNOSIS — R07.89 ATYPICAL CHEST PAIN: Primary | ICD-10-CM

## 2024-02-27 DIAGNOSIS — I10 ELEVATED BLOOD PRESSURE READING WITH DIAGNOSIS OF HYPERTENSION: ICD-10-CM

## 2024-02-27 LAB
ALBUMIN SERPL-MCNC: 4 G/DL (ref 3.5–5.2)
ALBUMIN/GLOB SERPL: 1.7 G/DL
ALP SERPL-CCNC: 74 U/L (ref 39–117)
ALT SERPL W P-5'-P-CCNC: 14 U/L (ref 1–33)
ANION GAP SERPL CALCULATED.3IONS-SCNC: 6 MMOL/L (ref 5–15)
AST SERPL-CCNC: 17 U/L (ref 1–32)
BASOPHILS # BLD AUTO: 0.05 10*3/MM3 (ref 0–0.2)
BASOPHILS NFR BLD AUTO: 0.7 % (ref 0–1.5)
BILIRUB SERPL-MCNC: 0.3 MG/DL (ref 0–1.2)
BUN SERPL-MCNC: 19 MG/DL (ref 8–23)
BUN/CREAT SERPL: 26 (ref 7–25)
CALCIUM SPEC-SCNC: 9.5 MG/DL (ref 8.6–10.5)
CHLORIDE SERPL-SCNC: 107 MMOL/L (ref 98–107)
CO2 SERPL-SCNC: 29 MMOL/L (ref 22–29)
CREAT SERPL-MCNC: 0.73 MG/DL (ref 0.57–1)
DEPRECATED RDW RBC AUTO: 48 FL (ref 37–54)
EGFRCR SERPLBLD CKD-EPI 2021: 84.3 ML/MIN/1.73
EOSINOPHIL # BLD AUTO: 0.18 10*3/MM3 (ref 0–0.4)
EOSINOPHIL NFR BLD AUTO: 2.5 % (ref 0.3–6.2)
ERYTHROCYTE [DISTWIDTH] IN BLOOD BY AUTOMATED COUNT: 13.4 % (ref 12.3–15.4)
GLOBULIN UR ELPH-MCNC: 2.4 GM/DL
GLUCOSE SERPL-MCNC: 102 MG/DL (ref 65–99)
HCT VFR BLD AUTO: 40 % (ref 34–46.6)
HGB BLD-MCNC: 13.1 G/DL (ref 12–15.9)
IMM GRANULOCYTES # BLD AUTO: 0.02 10*3/MM3 (ref 0–0.05)
IMM GRANULOCYTES NFR BLD AUTO: 0.3 % (ref 0–0.5)
LYMPHOCYTES # BLD AUTO: 1.37 10*3/MM3 (ref 0.7–3.1)
LYMPHOCYTES NFR BLD AUTO: 18.9 % (ref 19.6–45.3)
MCH RBC QN AUTO: 32 PG (ref 26.6–33)
MCHC RBC AUTO-ENTMCNC: 32.8 G/DL (ref 31.5–35.7)
MCV RBC AUTO: 97.6 FL (ref 79–97)
MONOCYTES # BLD AUTO: 0.58 10*3/MM3 (ref 0.1–0.9)
MONOCYTES NFR BLD AUTO: 8 % (ref 5–12)
NEUTROPHILS NFR BLD AUTO: 5.05 10*3/MM3 (ref 1.7–7)
NEUTROPHILS NFR BLD AUTO: 69.6 % (ref 42.7–76)
NRBC BLD AUTO-RTO: 0 /100 WBC (ref 0–0.2)
PLATELET # BLD AUTO: 244 10*3/MM3 (ref 140–450)
PMV BLD AUTO: 9.8 FL (ref 6–12)
POTASSIUM SERPL-SCNC: 3.8 MMOL/L (ref 3.5–5.2)
PROT SERPL-MCNC: 6.4 G/DL (ref 6–8.5)
QT INTERVAL: 432 MS
QT INTERVAL: 442 MS
QTC INTERVAL: 433 MS
QTC INTERVAL: 434 MS
RBC # BLD AUTO: 4.1 10*6/MM3 (ref 3.77–5.28)
SODIUM SERPL-SCNC: 142 MMOL/L (ref 136–145)
T4 FREE SERPL-MCNC: 1.32 NG/DL (ref 0.93–1.7)
TROPONIN T SERPL HS-MCNC: 13 NG/L
TROPONIN T SERPL HS-MCNC: 13 NG/L
TSH SERPL DL<=0.05 MIU/L-ACNC: 1.36 UIU/ML (ref 0.27–4.2)
WBC NRBC COR # BLD AUTO: 7.25 10*3/MM3 (ref 3.4–10.8)

## 2024-02-27 PROCEDURE — 36415 COLL VENOUS BLD VENIPUNCTURE: CPT

## 2024-02-27 PROCEDURE — 93005 ELECTROCARDIOGRAM TRACING: CPT | Performed by: EMERGENCY MEDICINE

## 2024-02-27 PROCEDURE — 84484 ASSAY OF TROPONIN QUANT: CPT | Performed by: PHYSICIAN ASSISTANT

## 2024-02-27 PROCEDURE — 85025 COMPLETE CBC W/AUTO DIFF WBC: CPT | Performed by: PHYSICIAN ASSISTANT

## 2024-02-27 PROCEDURE — 71045 X-RAY EXAM CHEST 1 VIEW: CPT

## 2024-02-27 PROCEDURE — 84443 ASSAY THYROID STIM HORMONE: CPT | Performed by: PHYSICIAN ASSISTANT

## 2024-02-27 PROCEDURE — 93005 ELECTROCARDIOGRAM TRACING: CPT | Performed by: PHYSICIAN ASSISTANT

## 2024-02-27 PROCEDURE — 80053 COMPREHEN METABOLIC PANEL: CPT | Performed by: PHYSICIAN ASSISTANT

## 2024-02-27 PROCEDURE — 84484 ASSAY OF TROPONIN QUANT: CPT | Performed by: EMERGENCY MEDICINE

## 2024-02-27 PROCEDURE — 99284 EMERGENCY DEPT VISIT MOD MDM: CPT

## 2024-02-27 PROCEDURE — 84439 ASSAY OF FREE THYROXINE: CPT | Performed by: PHYSICIAN ASSISTANT

## 2024-02-27 RX ORDER — SODIUM CHLORIDE 0.9 % (FLUSH) 0.9 %
10 SYRINGE (ML) INJECTION AS NEEDED
Status: DISCONTINUED | OUTPATIENT
Start: 2024-02-27 | End: 2024-02-27 | Stop reason: HOSPADM

## 2024-02-27 NOTE — DISCHARGE INSTRUCTIONS
ER workup was reassuring with 2 normal EKGs and 2 normal cardiac troponins.  Thyroid studies were also within normal limits and CBC and chemistries were normal.  Chest x-ray show no acute process.  It is also reassuring that patient had normal cardiac stress test in January, 2024.  She follows with Dr. Martins, is routine cardiologist.  We will refer her closely to our chest pain clinic and hypertension clinic for further assessment.  Recommend patient to take 2 home blood pressure readings per day on a log for close follow-up at the hypertension clinic.  Recommend low-sodium diet.  Continue with all other current medical management.  Return to the ER if worsening symptoms.

## 2024-02-27 NOTE — ED PROVIDER NOTES
Subjective   History of Present Illness  This is a 78-year-old female that presents the ER with transient, sharp chest pain that lasted 2 to 3 seconds while patient was reading in her bed tonight.  Pain radiated to the left axilla but she denied any associated shortness of breath or nausea/vomiting.  She denied any chest wall tenderness or pleuritic type component with chest pain.  Patient lives alone and she was concerned with chest pain.  She contacted EMS and blood pressure was elevated upon arrival with systolic 198.  Patient denies any nasal congestion or cough.  She denies any pedal edema to lower extremities.  She does have recurrent rhinorrhea which is chronic for her.  Patient denies any recent new medication changes.  She had a normal cardiac stress test on 1/17/2024 per Dr. Martins.  Patient says that she was not advised to hold her beta-blocker prior to the stress testing.  She initially performed stress test on treadmill, but due to heart rate not increasing due to beta-blocker usage, they converted her to chemical stress test which was within normal limits.  EF was greater than 70%.  Patient has past medical history of Sjogren's syndrome, hypertension, emphysema, osteoarthritis, hyperlipidemia, migraine headaches, GERD, paroxysmal atrial fibrillation on Eliquis, and history of hiatal hernia.  Patient denies any palpitations.  Blood pressure is trending down upon arrival from home and patient denies any chest pain at present.    History provided by:  Patient and EMS personnel  Chest Pain  Pain location:  L chest  Pain quality: sharp    Radiates to: left axilla.  Onset quality:  Sudden  Duration:  1 hour (Onset of chest pain 1 hour ago, only lasted 2-3 seconds.)  Chronicity:  New  Context comment:  Pt was reading in bed and had 2-3 second episode of sharp left sided CP with radiation to left axilla. No associated SOA/nausea. No cough/congestion.  BP was elevated with Systolic 198.  Relieved by:   Nothing  Worsened by:  Nothing  Ineffective treatments:  None tried  Associated symptoms: no abdominal pain, no anorexia, no anxiety, no back pain, no cough, no diaphoresis, no dizziness, no dysphagia, no fatigue, no fever, no headache, no heartburn, no lower extremity edema, no nausea, no near-syncope, no numbness, no orthopnea, no palpitations, no PND, no shortness of breath, no syncope, no vomiting and no weakness        Review of Systems   Constitutional: Negative.  Negative for activity change, appetite change, chills, diaphoresis, fatigue and fever.   HENT:  Positive for rhinorrhea (chronic rhinorrhea per patient.). Negative for congestion and trouble swallowing.    Respiratory:  Negative for cough and shortness of breath.    Cardiovascular:  Positive for chest pain (sharp, transient left sided CP with radiation to left axilla.). Negative for palpitations, orthopnea, syncope, PND and near-syncope.        History of PAF, pt on Eliquis. Normal stress test per Dr. Martins on 1/17/24 with EF >70%.   Gastrointestinal: Negative.  Negative for abdominal pain, anorexia, constipation, diarrhea, heartburn, nausea and vomiting.   Genitourinary: Negative.  Negative for dysuria, flank pain, frequency and urgency.   Musculoskeletal: Negative.  Negative for back pain.        History of osteoarthritis and Sjogren's syndrome   Neurological: Negative.  Negative for dizziness, weakness, numbness and headaches.   All other systems reviewed and are negative.      Past Medical History:   Diagnosis Date    Arthritis     Atrial fibrillation     Back pain     Bronchitis     pcp diagnosed recently     Cancer     precancerous removed from nose     Carotid stenosis, bilateral     Nonobstructive    Cataracts, bilateral     Chronic sinusitis     Emphysema lung     GERD (gastroesophageal reflux disease)     no longer issue     Heart murmur     at 18-   no longer real issue     Hiatal hernia     Hx: UTI (urinary tract infection)     back in  oct 2016- no s/s currently     Hyperlipidemia     Hypertensive disorder     Migraine     h/o     PMB (postmenopausal bleeding)     Sjoegren syndrome     Uterine fibroid     Wears glasses     Wrist fracture, left        Allergies   Allergen Reactions    Hydrocodone Nausea And Vomiting    Oxycodone Hallucinations and Other (See Comments)    Oxycodone-Acetaminophen Nausea And Vomiting and Other (See Comments)    Codeine Rash       Past Surgical History:   Procedure Laterality Date    BELPHAROPTOSIS REPAIR Bilateral     BREAST LUMPECTOMY Right     BREAST LUMPECTOMY Left 2009    CARDIOVASCULAR STRESS TEST      x3-4     COLONOSCOPY      x5    D & C HYSTEROSCOPY N/A 3/1/2017    Procedure: DILATATION AND CURETTAGE HYSTEROSCOPY;  Surgeon: Addis Mccord MD;  Location: [facility] OR;  Service:     ENDOSCOPY      x2    HEMORRHOIDECTOMY      SKIN BIOPSY      SKIN CANCER EXCISION      precancerous removed from nose     WISDOM TOOTH EXTRACTION         Family History   Problem Relation Age of Onset    Alzheimer's disease Mother 80    Colon cancer Father 60    Heart attack Father 40    Emphysema Father     Arthritis Sister     No Known Problems Maternal Grandmother     Other Maternal Grandfather         black lung    Stroke Paternal Grandmother     Stomach cancer Paternal Grandfather     Lactose intolerance Daughter         milk allergy       Social History     Socioeconomic History    Marital status:     Number of children: 1   Tobacco Use    Smoking status: Former     Packs/day: 0.25     Years: 10.00     Additional pack years: 0.00     Total pack years: 2.50     Types: Cigarettes     Quit date:      Years since quittin.1    Smokeless tobacco: Never   Vaping Use    Vaping Use: Never used    Passive vaping exposure: Yes   Substance and Sexual Activity    Alcohol use: Not Currently     Comment: occasional     Drug use: No    Sexual activity: Never           Objective   Physical Exam  Vitals and nursing  note reviewed.   Constitutional:       General: She is not in acute distress.     Appearance: Normal appearance. She is not ill-appearing, toxic-appearing or diaphoretic.      Comments: Pleasant elderly female.  Nontoxic.  No acute distress   HENT:      Head: Normocephalic and atraumatic.      Nose: Nose normal. No congestion or rhinorrhea.      Right Sinus: No maxillary sinus tenderness or frontal sinus tenderness.      Left Sinus: No maxillary sinus tenderness or frontal sinus tenderness.      Mouth/Throat:      Mouth: Mucous membranes are moist.      Pharynx: Oropharynx is clear.      Comments: Oral mucous membranes are moist  Eyes:      Extraocular Movements: Extraocular movements intact.      Conjunctiva/sclera: Conjunctivae normal.      Pupils: Pupils are equal, round, and reactive to light.   Cardiovascular:      Rate and Rhythm: Normal rate and regular rhythm. Occasional Extrasystoles are present.     Pulses: Normal pulses.      Heart sounds: Normal heart sounds. No murmur heard.     Comments: Regular rate with occasional ectopy.  No murmurs appreciated.  No pedal edema to lower extremities  Pulmonary:      Effort: Pulmonary effort is normal.      Breath sounds: Normal breath sounds. No decreased breath sounds, wheezing, rhonchi or rales.      Comments: Lungs are clear to auscultation bilaterally no pleuritic chest pain elicited with deep inspiration.  No decreased breath sounds concerning for consolidation  Chest:      Chest wall: No swelling, tenderness or crepitus.      Comments: No chest wall tenderness to palpation  Abdominal:      General: Bowel sounds are normal. There is no distension.      Palpations: Abdomen is soft.      Tenderness: There is no abdominal tenderness. There is no right CVA tenderness, left CVA tenderness, guarding or rebound.      Comments: Abdomen soft and nontender.  No flank or CVA tenderness   Musculoskeletal:         General: Normal range of motion.      Cervical back: Normal  range of motion and neck supple.      Right lower leg: No edema.      Left lower leg: No edema.   Skin:     General: Skin is warm and dry.   Neurological:      General: No focal deficit present.      Mental Status: She is alert and oriented to person, place, and time.      Cranial Nerves: Cranial nerves 2-12 are intact.      Sensory: Sensation is intact.      Motor: Motor function is intact.      Coordination: Coordination is intact.      Comments: Neuro intact and nonfocal         Procedures           ED Course  ED Course as of 02/27/24 0459   Tue Feb 27, 2024   0125 Normal stress test on 1/17/24 per Dr. Martins. EF was >70%. [FC]   0225 I personally interpreted EKG which showed sinus rhythm with sinus arrhythmia.  Patient does have occasional ectopy on auscultation.  I also interpreted chest x-ray which revealed no acute cardiopulmonary process or pulmonary vascular congestion.  CBC was within normal limits.  Blood pressure is trending down during the ER course and blood pressure is 153/59 upon my initial assessment. [FC]   0436 Repeat 2-hour EKG showed sinus bradycardia with a rate of 58 and first-degree AV block.  No acute ST-T wave changes consistent with ischemia.  Awaiting 2-hour high-sensitivity troponin results.  TSH and free T4 were within normal limits and chemistries were also within normal limits. [FC]   0454 Repeat 2-hour high-sensitivity troponin is 13.  Blood pressure has been stable throughout the ER course.  Recommend close follow-up with the chest pain clinic for atypical chest pain.  Patient follows with cardiologist, Dr. Martins, and it is reassuring that she had normal stress testing in January, 2024.  Recommend multiple home blood pressure readings, as well, and we will refer patient to our hypertension clinic for long-term management of high blood pressure.  Patient feels reassured.  She is ready for discharge to home. [FC]      ED Course User Index  [FC] Marielena Khan PA-C                 HEART Score: 3                            Recent Results (from the past 24 hour(s))   ECG 12 Lead Chest Pain    Collection Time: 02/27/24  1:45 AM   Result Value Ref Range    QT Interval 432 ms    QTC Interval 434 ms   Comprehensive Metabolic Panel    Collection Time: 02/27/24  2:05 AM    Specimen: Blood   Result Value Ref Range    Glucose 102 (H) 65 - 99 mg/dL    BUN 19 8 - 23 mg/dL    Creatinine 0.73 0.57 - 1.00 mg/dL    Sodium 142 136 - 145 mmol/L    Potassium 3.8 3.5 - 5.2 mmol/L    Chloride 107 98 - 107 mmol/L    CO2 29.0 22.0 - 29.0 mmol/L    Calcium 9.5 8.6 - 10.5 mg/dL    Total Protein 6.4 6.0 - 8.5 g/dL    Albumin 4.0 3.5 - 5.2 g/dL    ALT (SGPT) 14 1 - 33 U/L    AST (SGOT) 17 1 - 32 U/L    Alkaline Phosphatase 74 39 - 117 U/L    Total Bilirubin 0.3 0.0 - 1.2 mg/dL    Globulin 2.4 gm/dL    A/G Ratio 1.7 g/dL    BUN/Creatinine Ratio 26.0 (H) 7.0 - 25.0    Anion Gap 6.0 5.0 - 15.0 mmol/L    eGFR 84.3 >60.0 mL/min/1.73   Single High Sensitivity Troponin T    Collection Time: 02/27/24  2:05 AM    Specimen: Blood   Result Value Ref Range    HS Troponin T 13 <14 ng/L   TSH    Collection Time: 02/27/24  2:05 AM    Specimen: Blood   Result Value Ref Range    TSH 1.360 0.270 - 4.200 uIU/mL   T4, Free    Collection Time: 02/27/24  2:05 AM    Specimen: Blood   Result Value Ref Range    Free T4 1.32 0.93 - 1.70 ng/dL   CBC Auto Differential    Collection Time: 02/27/24  2:05 AM    Specimen: Blood   Result Value Ref Range    WBC 7.25 3.40 - 10.80 10*3/mm3    RBC 4.10 3.77 - 5.28 10*6/mm3    Hemoglobin 13.1 12.0 - 15.9 g/dL    Hematocrit 40.0 34.0 - 46.6 %    MCV 97.6 (H) 79.0 - 97.0 fL    MCH 32.0 26.6 - 33.0 pg    MCHC 32.8 31.5 - 35.7 g/dL    RDW 13.4 12.3 - 15.4 %    RDW-SD 48.0 37.0 - 54.0 fl    MPV 9.8 6.0 - 12.0 fL    Platelets 244 140 - 450 10*3/mm3    Neutrophil % 69.6 42.7 - 76.0 %    Lymphocyte % 18.9 (L) 19.6 - 45.3 %    Monocyte % 8.0 5.0 - 12.0 %    Eosinophil % 2.5 0.3 - 6.2 %    Basophil % 0.7 0.0 - 1.5 %     Immature Grans % 0.3 0.0 - 0.5 %    Neutrophils, Absolute 5.05 1.70 - 7.00 10*3/mm3    Lymphocytes, Absolute 1.37 0.70 - 3.10 10*3/mm3    Monocytes, Absolute 0.58 0.10 - 0.90 10*3/mm3    Eosinophils, Absolute 0.18 0.00 - 0.40 10*3/mm3    Basophils, Absolute 0.05 0.00 - 0.20 10*3/mm3    Immature Grans, Absolute 0.02 0.00 - 0.05 10*3/mm3    nRBC 0.0 0.0 - 0.2 /100 WBC   ECG 12 Lead Chest Pain    Collection Time: 02/27/24  3:50 AM   Result Value Ref Range    QT Interval 442 ms    QTC Interval 433 ms   Single High Sensitivity Troponin T    Collection Time: 02/27/24  4:05 AM    Specimen: Blood   Result Value Ref Range    HS Troponin T 13 <14 ng/L     Note: In addition to lab results from this visit, the labs listed above may include labs taken at another facility or during a different encounter within the last 24 hours. Please correlate lab times with ED admission and discharge times for further clarification of the services performed during this visit.    XR Chest 1 View   Final Result   Impression:   No active disease.            Electronically Signed: Kieran Nathan MD     2/27/2024 1:53 AM EST     Workstation ID: [id number]        Vitals:    02/27/24 0230 02/27/24 0345 02/27/24 0430 02/27/24 0445   BP: 149/82 161/75 155/65 146/65   BP Location:       Patient Position:       Pulse: 56 64 56 55   Resp:       Temp:       TempSrc:       SpO2: 97% 96% 93% 94%   Weight:       Height:         Medications   sodium chloride 0.9 % flush 10 mL (has no administration in time range)     ECG/EMG Results (last 24 hours)       ** No results found for the last 24 hours. **          ECG 12 Lead Chest Pain   Preliminary Result   Test Reason : Chest Pain   Blood Pressure :   */*   mmHG   Vent. Rate :  58 BPM     Atrial Rate :  58 BPM      P-R Int : 210 ms          QRS Dur :  86 ms       QT Int : 442 ms       P-R-T Axes :  53   1  52 degrees      QTc Int : 433 ms      Sinus bradycardia with 1st degree AV block   Possible Anterior infarct  , age undetermined   Abnormal ECG   When compared with ECG of 27-FEB-2024 01:45, (Unconfirmed)   No significant change was found      Referred By: EDMD           Confirmed By:       ECG 12 Lead Chest Pain   Preliminary Result   Test Reason : Chest Pain   Blood Pressure :   */*   mmHG   Vent. Rate :  61 BPM     Atrial Rate :  61 BPM      P-R Int : 206 ms          QRS Dur :  88 ms       QT Int : 432 ms       P-R-T Axes :  55  16  48 degrees      QTc Int : 434 ms      Sinus rhythm with marked sinus arrhythmia   Otherwise normal ECG   When compared with ECG of 09-AUG-2022 07:36,   No significant change was found      Referred By:            Confirmed By:           CHADS? Score for Atrial Fibrillation Stroke Risk - MDCalc  Calculated on Feb 27 2024 4:38 AM  2 points -> Intermediate risk of thromboembolic event. 4.0% risk of event per year if no coumadin. The adjusted stroke rate was the expected stroke rate per 100 person-years derived from the multivariable model assuming that aspirin was not taken.  HEART Score for Major Cardiac Events - MDCalc  Calculated on Feb 27 2024 4:38 AM  3 points -> Low Score (0-3 points) Risk of MACE of 0.9-1.7%.  Medical Decision Making  Amount and/or Complexity of Data Reviewed  Labs: ordered.  Radiology: ordered.  ECG/medicine tests: ordered.    Risk  Prescription drug management.        Final diagnoses:   Atypical chest pain   Elevated blood pressure reading with diagnosis of hypertension   History of hypertension   History of gastroesophageal reflux (GERD)   Sjogren's syndrome, with unspecified organ involvement   PAF (paroxysmal atrial fibrillation)   Chronic anticoagulation       ED Disposition  ED Disposition       ED Disposition   Discharge    Condition   Stable    Comment   --               [facility] CARDIOLOGY  [facility] [address] [address]  [facility] [address] [phone]  Call today  Call today for close follow-up    [facility]  CARDIOLOGY  [address]  [address]  [facility] [id number] [phone]  Call today  Call today for close recheck    Todd Martins MD  [address]  [place] [id number]  [phone]    Call today  Call today for close recheck    [facility] EMERGENCY DEPARTMENT  [address] [facility] [id number] [phone]    If symptoms worsen         Medication List      No changes were made to your prescriptions during this visit.            Marielena Khan PA-C  02/27/24 0459

## 2024-02-28 ENCOUNTER — OFFICE VISIT (OUTPATIENT)
Dept: CARDIOLOGY | Facility: HOSPITAL | Age: 79
End: 2024-02-28
Payer: MEDICARE

## 2024-02-28 VITALS
SYSTOLIC BLOOD PRESSURE: 131 MMHG | WEIGHT: 125.13 LBS | HEART RATE: 58 BPM | HEIGHT: 62 IN | OXYGEN SATURATION: 100 % | BODY MASS INDEX: 23.03 KG/M2 | RESPIRATION RATE: 17 BRPM | TEMPERATURE: 97.9 F | DIASTOLIC BLOOD PRESSURE: 62 MMHG

## 2024-02-28 DIAGNOSIS — I10 PRIMARY HYPERTENSION: ICD-10-CM

## 2024-02-28 DIAGNOSIS — E78.2 MIXED HYPERLIPIDEMIA: ICD-10-CM

## 2024-02-28 DIAGNOSIS — I48.0 PAROXYSMAL ATRIAL FIBRILLATION: ICD-10-CM

## 2024-02-28 DIAGNOSIS — R07.89 CHEST PAIN, ATYPICAL: Primary | ICD-10-CM

## 2024-02-29 NOTE — PROGRESS NOTES
"Chief Complaint  Follow-up and Chest Pain    Subjective    History of Present Illness {CC  Problem List  Visit  Diagnosis   Encounters  Notes  Medications  Labs  Result Review Imaging  Media :23}       History of Present Illness   78-year-old female presents the office today for ongoing evaluation of her recent chest pain and elevated blood pressure readings. She presented to [facility] ED yesterday after experiencing chest pain that started while she was reading in bed.  She notes that it did radiate to the left axilla but she denied any associated dyspnea, nausea, vomiting or dizziness.  Upon EMS arrival blood pressure was elevated in the 190s and she was brought to the hospital.  She did have a normal stress test January 2024.  Reports that blood pressures have been elevated 140s to 150s and she is concerned about that.  Notes chest pain has resolved.  History of hypertension, hyperlipidemia, Sjogren's disease, PAF  Objective     Vital Signs:   Vitals:    02/28/24 1355   BP: 131/62   BP Location: Right arm   Patient Position: Sitting   Cuff Size: Adult   Pulse: 58   Resp: 17   Temp: 97.9 °F (36.6 °C)   TempSrc: Temporal   SpO2: 100%   Weight: 56.8 kg (125 lb 2 oz)   Height: 157.5 cm (62.01\")     Body mass index is 22.88 kg/m².  Physical Exam  Vitals and nursing note reviewed.   Constitutional:       Appearance: Normal appearance.   HENT:      Head: Normocephalic.   Eyes:      Pupils: Pupils are equal, round, and reactive to light.   Cardiovascular:      Rate and Rhythm: Normal rate and regular rhythm.      Pulses: Normal pulses.      Heart sounds: Normal heart sounds. No murmur heard.  Pulmonary:      Effort: Pulmonary effort is normal.      Breath sounds: Normal breath sounds.   Abdominal:      General: Bowel sounds are normal.      Palpations: Abdomen is soft.   Musculoskeletal:         General: Normal range of motion.      Cervical back: Normal range of motion.      Right lower leg: No " edema.      Left lower leg: No edema.   Skin:     General: Skin is warm and dry.      Capillary Refill: Capillary refill takes less than 2 seconds.   Neurological:      Mental Status: She is alert and oriented to person, place, and time.   Psychiatric:         Mood and Affect: Mood normal.         Thought Content: Thought content normal.              Result Review  Data Reviewed:{ Labs  Result Review  Imaging  Med Tab  Media :23}   ECG 12 Lead Chest Pain (02/27/2024 03:50)  ECG 12 Lead Chest Pain (02/27/2024 01:45)  Stress Test With Myocardial Perfusion (1 Day) (01/17/2024 11:08)    CBC & Differential (02/27/2024 02:05)  Comprehensive Metabolic Panel (02/27/2024 02:05)  Single High Sensitivity Troponin T (02/27/2024 02:05)  TSH (02/27/2024 02:05)  T4, Free (02/27/2024 02:05)  Single High Sensitivity Troponin T (02/27/2024 04:05)     Assessment and Plan {CC Problem List  Visit Diagnosis  ROS  Review (Popup)  Health Maintenance  Quality  BestPractice  Medications  SmartSets  SnapShot Encounters  Media :23}   1. Chest pain, atypical  Stress test January 2024 that was within normal limits    2. Primary hypertension  Patient to begin monitoring blood pressure 2 times a day for the next week.  Continue Lotrel 10/20 and bisoprolol 5 mg daily  Patient to notify office of blood pressure readings and if blood pressures are elevated will increase Lotrel to 10/40  3. Paroxysmal atrial fibrillation  CHADS-VASc Risk Assessment              4 Total Score    1 Hypertension    2 Age >/= 75    1 Sex: Female        Criteria that do not apply:    CHF    DM    PRIOR STROKE/TIA/THROMBO    Vascular Disease    Age 65-74          Anticoagulant with Eliquis and denies any signs and symptoms of bleeding  Stable on bisoprolol    4. Mixed hyperlipidemia  Stable on Lipitor          Follow Up {Instructions Charge Capture  Follow-up Communications :23}   Return if symptoms worsen or fail to improve.    Patient was given  instructions and counseling regarding her condition or for health maintenance advice. Please see specific information pulled into the AVS if appropriate.  Patient was instructed to call the [facility] with any questions, concerns, or worsening symptoms.

## 2024-03-01 ENCOUNTER — TELEPHONE (OUTPATIENT)
Dept: CARDIOLOGY | Facility: HOSPITAL | Age: 79
End: 2024-03-01
Payer: MEDICARE

## 2024-03-01 NOTE — TELEPHONE ENCOUNTER
Called and spoke with patient regarding question of medication labeling and medication names. D/w patient that AMLODIPINE/BENAZEPRIL 10/20MG CAP and AMLODIPINE BESY-BENAZEP R10-20 CAPS are the same medications. Patient verbalized understanding.    Magdy Holly, PharmD, BCPS  3/1/2024  15:50 EST

## 2024-03-19 ENCOUNTER — OFFICE VISIT (OUTPATIENT)
Dept: CARDIOLOGY | Facility: CLINIC | Age: 79
End: 2024-03-19
Payer: MEDICARE

## 2024-03-19 VITALS
HEART RATE: 61 BPM | BODY MASS INDEX: 23.22 KG/M2 | HEIGHT: 62 IN | WEIGHT: 126.2 LBS | OXYGEN SATURATION: 97 % | DIASTOLIC BLOOD PRESSURE: 70 MMHG | SYSTOLIC BLOOD PRESSURE: 122 MMHG

## 2024-03-19 DIAGNOSIS — I10 ESSENTIAL HYPERTENSION: ICD-10-CM

## 2024-03-19 DIAGNOSIS — E78.2 MIXED HYPERLIPIDEMIA: ICD-10-CM

## 2024-03-19 DIAGNOSIS — R00.2 PALPITATIONS: ICD-10-CM

## 2024-03-19 DIAGNOSIS — R07.89 CHEST PAIN, ATYPICAL: Primary | ICD-10-CM

## 2024-03-19 PROCEDURE — 3074F SYST BP LT 130 MM HG: CPT | Performed by: INTERNAL MEDICINE

## 2024-03-19 PROCEDURE — 3078F DIAST BP <80 MM HG: CPT | Performed by: INTERNAL MEDICINE

## 2024-03-19 PROCEDURE — 99214 OFFICE O/P EST MOD 30 MIN: CPT | Performed by: INTERNAL MEDICINE

## 2024-03-19 RX ORDER — AZITHROMYCIN 250 MG/1
TABLET, FILM COATED ORAL
COMMUNITY
Start: 2024-03-18

## 2024-03-19 RX ORDER — NEBIVOLOL 5 MG/1
5 TABLET ORAL DAILY
Qty: 90 TABLET | Refills: 3 | Status: SHIPPED | OUTPATIENT
Start: 2024-03-19

## 2024-03-19 NOTE — PROGRESS NOTES
[facility] Cardiology  Office Progress Note  Mary Saucedo  1945  [address] [facility] [address]       Visit Date: 03/19/24    PCP: Brianna Marques MD  [address] [facility] [id number]    IDENTIFICATION: A 79 y.o. female .   Retired from  at [employer].  Currently part-time worker at [employer]     PROBLEM LIST:      Coronary artery disease  10/18 GXT: No chest pain or discomfort.    Max heart rate 131, 89% of MPHR.  Arrhythmias were not significant.  Negative stress ekg for ischemia.  1/20 exercise MPS:  normal myocardial perfusion .  LVEF = 70%.  Coronary calcification noted on CT attenuation images.    8/22 LexiPet wnl EF 64% marked coronary Ca2+  1/24 Stress MPS: low risk with normal LVEF  Dizziness/palpitations  10/18 30-day MCOT: Baseline rhythm NSR with AVG 73, min 46, max 115.  Otherwise normal Holter monitor.  8/22 holter nonsustained afib  10/22 MCOT 30d: AF <1%, total 52 minutes  51/71/120  Cardiac murmur   10/18 2D echo: LV systolic function WNL (66 to 70%).  Grade 1 diastolic dysfunction.  LA cavity mildly dilated.  8/22 echo EF 60% rvsp 23 IR  Carotid artery disease  10/18 carotid US: Bilateral ICA stenosis of 0-49%.  Antegrade vertebrals bilaterally.  Hypertension  Hyperlipidemia     8/22 148/46/68/70   Former smoker-cessation 2015  Emphysema/COPD  GERD  Hiatal hernia   Sjogren's syndrome  Surgical   blepharoptosis repair  Breast lumpectomy  D&C hysteroscopy  Hemorrhoidectomy  [place] tooth extraction        CC:   Chief Complaint   Patient presents with    Chest Pain     ER follow up       Allergies  Allergies   Allergen Reactions    Hydrocodone Nausea And Vomiting    Oxycodone Hallucinations and Other (See Comments)    Oxycodone-Acetaminophen Nausea And Vomiting and Other (See Comments)    Codeine Rash       Current Medications  Current Outpatient Medications   Medication Instructions    acetaminophen  "(TYLENOL) 650 mg, Oral, Every 4 Hours PRN    amLODIPine-benazepril (LOTREL) 10-20 MG per capsule 1 capsule, Oral, Daily    apixaban (ELIQUIS) 5 mg, Oral, 2 Times Daily    atorvastatin (LIPITOR) 20 mg, Oral, Daily    azithromycin (ZITHROMAX) 250 MG tablet Dental infection    busPIRone (BUSPAR) 5 MG tablet As Needed.    Cholecalciferol (VITAMIN D3) 2000 UNITS tablet 1 capsule, Oral, Daily    fexofenadine (ALLEGRA) 180 MG tablet Take 1 tablet every day by oral route for 30 days.    gabapentin (NEURONTIN) 100 MG capsule Take 1 capsule by mouth Daily As Needed. PRN    ipratropium (ATROVENT) 0.06 % nasal spray ipratropium bromide 42 mcg (0.06 %) nasal spray   USE 2 SPRAYS IN EACH NOSTRIL THREE TIMES DAILY    levocetirizine (XYZAL) 5 mg, Oral, Every Evening    montelukast (SINGULAIR) 10 MG tablet Take 1 tablet by mouth As Needed.    Vitamin B-12 5,000 mcg, Sublingual, Daily        History of Present Illness   Mary Saucedo is a 79 y.o. year old female here for follow up after ER visit.  Patient had a sharp character substernal discomfort at home reading.  She became very anxious and hypertensive presents emergency department was treated and released she also notes having separately of potential viral infection and rash.  She is seeing her allergist primary care provider.  She has upcoming appointment with her gastroenterologist for irritable bowel syndrome.  She had followed up heart valve center and she was monitoring blood pressure.  She has not heard back from them regarding her persistently hypertensive metrics    OBJECTIVE:  Vitals:    03/19/24 1532   BP: 122/70   BP Location: Right arm   Patient Position: Sitting   Pulse: 61   SpO2: 97%   Weight: 57.2 kg (126 lb 3.2 oz)   Height: 157.5 cm (62\")     Body mass index is 23.08 kg/m².    Constitutional:       Appearance: Healthy appearance. Not in distress.   Neck:      Vascular: No JVR. JVD normal.   Pulmonary:      Effort: Pulmonary effort is normal.      " Breath sounds: Normal breath sounds. No wheezing. No rhonchi. No rales.   Chest:      Chest wall: Not tender to palpatation.   Cardiovascular:      PMI at left midclavicular line. Normal rate. Regular rhythm. Normal S1. Normal S2.       Murmurs: There is no murmur.      No gallop.  No click. No rub.   Pulses:     Intact distal pulses.   Edema:     Peripheral edema absent.   Abdominal:      General: Bowel sounds are normal.      Palpations: Abdomen is soft.      Tenderness: There is no abdominal tenderness.   Musculoskeletal: Normal range of motion.         General: No tenderness. Skin:     General: Skin is warm and dry.   Neurological:      General: No focal deficit present.      Mental Status: Alert and oriented to person, place and time.         Diagnostic Data:  Procedures        ASSESSMENT:   Diagnosis Plan   1. Chest pain, atypical        2. Mixed hyperlipidemia        3. Essential hypertension        4. Palpitations            PLAN:  Chest pain atypical with recent low risk ischemic test we will continue GDMT    Mixed dyslipidemia controlled on statin therapy    Hypertension uncontrolled we will transition bisoprolol to Bystolic        Todd Martins MD, [facility]

## 2024-03-20 ENCOUNTER — TELEPHONE (OUTPATIENT)
Dept: CARDIOLOGY | Facility: HOSPITAL | Age: 79
End: 2024-03-20
Payer: MEDICARE

## 2024-03-20 NOTE — TELEPHONE ENCOUNTER
Called patient and she stated Dr. Martins changed her Bisoprolol 5 mg to Nebivolol 5 mg daily. She inquired if she still needs to take Amlodipine-Benazepril together with Nebivolol. Informed patient that she needs to take both BP meds as per chart review. Instructed patient to take and note her blood pressure 2 hours after intake of BP meds for a week and report if Nebivolol is not helping control her blood pressure. Verbalized understanding.

## 2024-07-09 ENCOUNTER — TELEPHONE (OUTPATIENT)
Dept: CARDIOLOGY | Facility: CLINIC | Age: 79
End: 2024-07-09
Payer: MEDICARE

## 2024-07-09 NOTE — TELEPHONE ENCOUNTER
Spoke with pt regarding insurance denial of Eliquis 5 mg . Pt given samples and will discuss with LUZ at appointment in August .

## 2024-07-16 ENCOUNTER — TELEPHONE (OUTPATIENT)
Dept: CARDIOLOGY | Facility: CLINIC | Age: 79
End: 2024-07-16
Payer: MEDICARE

## 2024-07-16 NOTE — TELEPHONE ENCOUNTER
Patient denied for a tier exception for eliquis.  Discussed with OVIDIO Lim.  Patient will need to apply for patient assistance for this medication.  LM for patient to return call.

## 2024-07-31 ENCOUNTER — TELEPHONE (OUTPATIENT)
Dept: CARDIOLOGY | Facility: CLINIC | Age: 79
End: 2024-07-31
Payer: MEDICARE

## 2024-08-01 ENCOUNTER — OFFICE VISIT (OUTPATIENT)
Dept: CARDIOLOGY | Facility: CLINIC | Age: 79
End: 2024-08-01
Payer: MEDICARE

## 2024-08-01 VITALS
DIASTOLIC BLOOD PRESSURE: 80 MMHG | BODY MASS INDEX: 21.48 KG/M2 | WEIGHT: 121.2 LBS | SYSTOLIC BLOOD PRESSURE: 140 MMHG | HEART RATE: 56 BPM | OXYGEN SATURATION: 99 % | HEIGHT: 63 IN

## 2024-08-01 DIAGNOSIS — I48.0 PAROXYSMAL ATRIAL FIBRILLATION: ICD-10-CM

## 2024-08-01 DIAGNOSIS — R00.2 PALPITATIONS: ICD-10-CM

## 2024-08-01 DIAGNOSIS — E78.2 MIXED HYPERLIPIDEMIA: ICD-10-CM

## 2024-08-01 DIAGNOSIS — I25.10 CORONARY ARTERY DISEASE INVOLVING NATIVE CORONARY ARTERY OF NATIVE HEART WITHOUT ANGINA PECTORIS: Primary | ICD-10-CM

## 2024-08-01 DIAGNOSIS — I10 PRIMARY HYPERTENSION: ICD-10-CM

## 2024-08-01 PROCEDURE — 93000 ELECTROCARDIOGRAM COMPLETE: CPT | Performed by: INTERNAL MEDICINE

## 2024-08-01 PROCEDURE — 3077F SYST BP >= 140 MM HG: CPT | Performed by: INTERNAL MEDICINE

## 2024-08-01 PROCEDURE — 3079F DIAST BP 80-89 MM HG: CPT | Performed by: INTERNAL MEDICINE

## 2024-08-01 PROCEDURE — 99214 OFFICE O/P EST MOD 30 MIN: CPT | Performed by: INTERNAL MEDICINE

## 2024-08-01 RX ORDER — PANTOPRAZOLE SODIUM 40 MG/1
TABLET, DELAYED RELEASE ORAL
COMMUNITY
Start: 2024-07-18

## 2024-08-01 NOTE — PROGRESS NOTES
[facility] Cardiology  Office Progress Note  Mary Saucedo  1945  [address] [facility] [address]       Visit Date: 08/01/24    PCP: Brianna Marques MD  [address] [facility] [address]    IDENTIFICATION: A 79 y.o. female .   Retired from  at [employer].  Currently part-time worker at [employer]     PROBLEM LIST:      Coronary artery disease  10/18 GXT: No chest pain or discomfort.    Max heart rate 131, 89% of MPHR.  Arrhythmias were not significant.  Negative stress ekg for ischemia.  1/20 exercise MPS:  normal myocardial perfusion .  LVEF = 70%.  Coronary calcification noted on CT attenuation images.    8/22 LexiPet wnl EF 64% marked coronary Ca2+  1/24 Stress MPS: low risk with normal LVEF  Dizziness/palpitations  10/18 30-day MCOT: Baseline rhythm NSR with AVG 73, min 46, max 115.  Otherwise normal Holter monitor.  8/22 holter nonsustained afib  10/22 MCOT 30d: AF <1%, total 52 minutes  51/71/120  Cardiac murmur   10/18 2D echo: LV systolic function WNL (66 to 70%).  Grade 1 diastolic dysfunction.  LA cavity mildly dilated.  8/22 echo EF 60% rvsp 23 IR  Carotid artery disease  10/18 carotid US: Bilateral ICA stenosis of 0-49%.  Antegrade vertebrals bilaterally.  Hypertension  Hyperlipidemia  8/22 148/46/68/70   Former smoker-cessation 2015  Emphysema/COPD  GERD  Hiatal hernia   Sjogren's syndrome  Surgical history:  Blepharoptosis repair  Breast lumpectomy  D&C hysteroscopy  Hemorrhoidectomy  [place] tooth extraction        CC:   Chief Complaint   Patient presents with    Chest pain, atypical       Allergies  Allergies   Allergen Reactions    Hydrocodone Nausea And Vomiting    Oxycodone Hallucinations and Other (See Comments)    Oxycodone-Acetaminophen Nausea And Vomiting and Other (See Comments)    Codeine Rash       Current Medications  Current Outpatient Medications   Medication Instructions     "amLODIPine-benazepril (LOTREL) 10-20 MG per capsule 1 capsule, Oral, Daily    apixaban (ELIQUIS) 5 mg, Oral, 2 Times Daily    atorvastatin (LIPITOR) 20 mg, Oral, Daily    busPIRone (BUSPAR) 5 MG tablet As Needed.    Cholecalciferol (VITAMIN D3) 2000 UNITS tablet 1 capsule, Oral, Daily    fexofenadine (ALLEGRA) 180 MG tablet Take 1 tablet every day by oral route for 30 days.    ipratropium (ATROVENT) 0.06 % nasal spray ipratropium bromide 42 mcg (0.06 %) nasal spray   USE 2 SPRAYS IN EACH NOSTRIL THREE TIMES DAILY    levocetirizine (XYZAL) 5 mg, Oral, Every Evening    montelukast (SINGULAIR) 10 MG tablet Take 1 tablet by mouth As Needed.    nebivolol (BYSTOLIC) 5 mg, Oral, Daily    pantoprazole (PROTONIX) 40 MG EC tablet     Vitamin B-12 5,000 mcg, Sublingual, Daily        History of Present Illness   Mary Saucedo is a 79 y.o. year old female here for follow up.  Patient been diagnosed with parathyroid adenoma and has scheduled resection per Dr. Jb Fry in clinic.  She is noted occasional pleuritic type of disc comfort but nothing sustained.  She had an abdominal CAT scan most recently revealed aortic atherosclerotic calcification of which is concerning for her    OBJECTIVE:  Vitals:    08/01/24 1540   BP: 140/80   Pulse: 56   SpO2: 99%   Weight: 55 kg (121 lb 3.2 oz)   Height: 160 cm (63\")     Body mass index is 21.47 kg/m².    Constitutional:       Appearance: Healthy appearance. Not in distress.   Neck:      Vascular: No JVR. JVD normal.   Pulmonary:      Effort: Pulmonary effort is normal.      Breath sounds: Normal breath sounds. No wheezing. No rhonchi. No rales.   Chest:      Chest wall: Not tender to palpatation.   Cardiovascular:      PMI at left midclavicular line. Normal rate. Occasional ectopic beats. Regular rhythm. Normal S1. Normal S2.       Murmurs: There is no murmur.      No gallop.  No click. No rub.   Pulses:     Intact distal pulses.   Edema:     Peripheral edema absent.   Abdominal: "      General: Bowel sounds are normal.      Palpations: Abdomen is soft.      Tenderness: There is no abdominal tenderness.   Musculoskeletal: Normal range of motion.         General: No tenderness. Skin:     General: Skin is warm and dry.   Neurological:      General: No focal deficit present.      Mental Status: Alert and oriented to person, place and time.         Diagnostic Data:    ECG 12 Lead    Date/Time: 8/1/2024 4:56 PM  Performed by: Todd Martins MD    Authorized by: Todd Martins MD  Comparison: compared with previous ECG from 2/27/2024  Similar to previous ECG  Rhythm: sinus rhythm  Ectopy: atrial premature contractions  BPM: 57    Clinical impression: abnormal EKG          Advance Care Planning            ASSESSMENT:   Diagnosis Plan   1. Coronary artery disease involving native coronary artery of native heart without angina pectoris        2. Primary hypertension        3. Mixed hyperlipidemia        4. Palpitations        5. Paroxysmal atrial fibrillation            PLAN:  Acceptable cardiac risk for proposed parathyroid adenoma resection.  She would need to discontinue apixaban at least 48 hours prior to procedure    Hypertension controlled nebivolol Lotrel, patient states that nebivolol is tier 3 on her insurance may need to transition to alternative beta-blockade however this has been effective for her paroxysmal atrial fibrillation and hypertension    Mixed dyslipidemia controlled on statin therapy    Paroxysmal A-fib maintaining sinus mechanism with PACs on apixaban    CAD as manifested as coronary calcification.  I discussed with Ms. Saucedo in the office today that she has evidence of atherosclerotic plaque diffusely and continued aggressive medical management current is recommended        Todd Martins MD, FACC

## 2024-10-23 ENCOUNTER — TELEPHONE (OUTPATIENT)
Dept: CARDIOLOGY | Facility: CLINIC | Age: 79
End: 2024-10-23
Payer: MEDICARE

## 2024-10-23 DIAGNOSIS — I48.0 PAROXYSMAL ATRIAL FIBRILLATION: ICD-10-CM

## 2024-10-23 DIAGNOSIS — R00.2 PALPITATIONS: Primary | ICD-10-CM

## 2024-10-23 NOTE — TELEPHONE ENCOUNTER
Caller: Mary Saucedo    Relationship: Self    Best call back number: [phone]     What is the best time to reach you: ANY    Who are you requesting to speak with (clinical staff, provider,  specific staff member): CLINICAL      What was the call regarding: PATIENT STATES SHE HAS BEEN SUFFERING FROM A LOW HEART RATE FOR APPROX 2 WEEKS. PATIENT IS UNSURE IF IT IS BECAUSE OF THE NEBIVOLOL MEDICATION. PLEASE CONTACT PATIENT AND ADVISE HOW TO PROCEED.     Is it okay if the provider responds through [facility]: PLEASE CALL

## 2024-10-23 NOTE — TELEPHONE ENCOUNTER
EKG +/- 48 hr holter - may be having PAC/PVC that aren't counting.   11% PACs on last monitor in 2022  Would not stop nebivolol if BP normal         Pt called and made aware of the above results and recommendations , pt to come to [place] for EKG and potential Holter , order placed.

## 2024-10-23 NOTE — TELEPHONE ENCOUNTER
EKG +/- 48 hr holter - may be having PAC/PVC that aren't counting.   11% PACs on last monitor in 2022  Would not stop nebivolol if BP normal

## 2024-10-24 ENCOUNTER — CLINICAL SUPPORT (OUTPATIENT)
Dept: CARDIOLOGY | Facility: CLINIC | Age: 79
End: 2024-10-24
Payer: MEDICARE

## 2024-10-24 DIAGNOSIS — R00.1 BRADYCARDIA: Primary | ICD-10-CM

## 2024-11-04 RX ORDER — NEBIVOLOL 5 MG/1
5 TABLET ORAL DAILY
Qty: 90 TABLET | Refills: 3 | Status: SHIPPED | OUTPATIENT
Start: 2024-11-04

## 2024-11-05 ENCOUNTER — TELEPHONE (OUTPATIENT)
Dept: CARDIOLOGY | Facility: CLINIC | Age: 79
End: 2024-11-05
Payer: MEDICARE

## 2024-11-05 DIAGNOSIS — I49.1 PREMATURE ATRIAL CONTRACTIONS: Primary | ICD-10-CM

## 2024-11-05 RX ORDER — FLECAINIDE ACETATE 50 MG/1
50 TABLET ORAL 2 TIMES DAILY
Qty: 60 TABLET | Refills: 3 | Status: SHIPPED | OUTPATIENT
Start: 2024-11-05

## 2024-11-05 NOTE — TELEPHONE ENCOUNTER
Todd Martins MD Jackson, Kristina, RN  29% pacs.  Rec add flecainide 50 bid to current Rx and check EKG in 48hrs post start    Pt called made aware of the above results, verbalized understanding .   Order placed , prescription sent, requested lab work from PCP .

## 2024-11-11 ENCOUNTER — CLINICAL SUPPORT (OUTPATIENT)
Dept: CARDIOLOGY | Facility: CLINIC | Age: 79
End: 2024-11-11
Payer: MEDICARE

## 2024-11-11 ENCOUNTER — DOCUMENTATION (OUTPATIENT)
Dept: CARDIOLOGY | Facility: CLINIC | Age: 79
End: 2024-11-11
Payer: MEDICARE

## 2024-11-11 DIAGNOSIS — R00.2 PALPITATIONS: Primary | ICD-10-CM

## 2024-11-11 PROCEDURE — 93000 ELECTROCARDIOGRAM COMPLETE: CPT | Performed by: NURSE PRACTITIONER

## 2024-11-11 NOTE — PROGRESS NOTES
Patient came in for walk-in ECG and was supposed to see OVIDIO Lim.  Patient recently started flecainide a few days ago.  ECG today as a walk-in showed normal QRS. PACs no longer present compared to previous ECG October 2024.  Of note, patient had recently worn a 48-hour Holter monitor showing 29% PAC burden.  Patient asymptomatic.  She notes that she has had a lot of stomach issues and is following for testing with Dr. Cobos.    Electronically signed by OVIDIO Jones, 11/11/24, 11:20 AM EST.

## 2024-12-06 ENCOUNTER — TELEPHONE (OUTPATIENT)
Dept: CARDIOLOGY | Facility: CLINIC | Age: 79
End: 2024-12-06
Payer: MEDICARE

## 2024-12-06 NOTE — TELEPHONE ENCOUNTER
Caller: DELMAR MANN     Relationship:SELF     Callback number: [phone]   Is it ok to leave a message: [x] Yes [] No    Requested medication for samples: ELIQUIS 5 MG     Who will be picking up the samples: PATIENT HERSELF     Do you need information about patient financial assistance for this medication: [] Yes [x] No    Additional details provided: PATIENT NEEDS FOR MORE TO LAST HER TILL THE END OF THE MONTH AND THE BEGINNING OF JANUARY. STATES SHE JUST NEEDS ONE CARD OR TWO.

## 2025-03-13 RX ORDER — FLECAINIDE ACETATE 50 MG/1
50 TABLET ORAL 2 TIMES DAILY
Qty: 180 TABLET | Refills: 3 | Status: SHIPPED | OUTPATIENT
Start: 2025-03-13

## 2025-03-25 ENCOUNTER — TELEPHONE (OUTPATIENT)
Dept: CARDIOLOGY | Facility: CLINIC | Age: 80
End: 2025-03-25
Payer: MEDICARE

## 2025-06-27 ENCOUNTER — TELEPHONE (OUTPATIENT)
Dept: CARDIOLOGY | Facility: CLINIC | Age: 80
End: 2025-06-27

## 2025-06-27 RX ORDER — FLECAINIDE ACETATE 50 MG/1
50 TABLET ORAL 2 TIMES DAILY
Qty: 180 TABLET | Refills: 3 | Status: SHIPPED | OUTPATIENT
Start: 2025-06-27

## 2025-06-27 NOTE — TELEPHONE ENCOUNTER
Caller: DELMAR MANN    Relationship:PATIENT    Callback number: [phone]    Is it ok to leave a message: [x] Yes [] No    Requested medication for samples: FLECAINIDE    How much medication does the patient currently have left: PATIENT IS OUT    Who will be picking up the samples: PATIENT    Do you need information about patient financial assistance for this medication: [] Yes [x] No    Additional details provided: PATIENT HAS MISPLACED HER MEDICATION AND IS REQUESTING SAMPLES UNTIL SHE CAN LOCATE IT. PLEASE CONTACT PATIENT WHEN READY FOR PICK  UP.

## 2025-08-15 ENCOUNTER — TELEPHONE (OUTPATIENT)
Dept: CARDIOLOGY | Facility: CLINIC | Age: 80
End: 2025-08-15

## (undated) DEVICE — 3M™ WARMING BLANKET, UPPER BODY, 10 PER CASE, 42268: Brand: BAIR HUGGER™

## (undated) DEVICE — AIRWY 90MM NO9

## (undated) DEVICE — GLV SURG SENSICARE MICRO PF LF 6 STRL

## (undated) DEVICE — LEX D AND C: Brand: MEDLINE INDUSTRIES, INC.

## (undated) DEVICE — SYR LUERLOK 50ML

## (undated) DEVICE — ST EXT IV TBG W SECUR LK 20IN

## (undated) DEVICE — MEDI-VAC NON-CONDUCTIVE SUCTION TUBING: Brand: CARDINAL HEALTH

## (undated) DEVICE — CANNULA,ADULT,SOFT-TOUCH,7TUBE,SC: Brand: MEDLINE

## (undated) DEVICE — GAMMEX® NON-LATEX SIZE 6.5, STERILE NEOPRENE POWDER-FREE SURGICAL GLOVE: Brand: GAMMEX

## (undated) DEVICE — LUER-LOK 360°: Brand: CONNECTA, LUER-LOK

## (undated) DEVICE — DRAPE,TOP,102X53,STERILE: Brand: MEDLINE

## (undated) DEVICE — MEDI-VAC YANKAUER SUCTION HANDLE W/BULBOUS TIP: Brand: CARDINAL HEALTH